# Patient Record
Sex: FEMALE | Race: WHITE | NOT HISPANIC OR LATINO | Employment: OTHER | URBAN - METROPOLITAN AREA
[De-identification: names, ages, dates, MRNs, and addresses within clinical notes are randomized per-mention and may not be internally consistent; named-entity substitution may affect disease eponyms.]

---

## 2020-09-24 ENCOUNTER — OFFICE VISIT (OUTPATIENT)
Dept: ORTHOPEDICS | Facility: CLINIC | Age: 75
End: 2020-09-24
Payer: MEDICARE

## 2020-09-24 VITALS
DIASTOLIC BLOOD PRESSURE: 70 MMHG | BODY MASS INDEX: 20.89 KG/M2 | SYSTOLIC BLOOD PRESSURE: 118 MMHG | HEIGHT: 66 IN | WEIGHT: 130 LBS | HEART RATE: 60 BPM

## 2020-09-24 DIAGNOSIS — M25.562 ACUTE PAIN OF LEFT KNEE: ICD-10-CM

## 2020-09-24 DIAGNOSIS — M23.301 DEGENERATIVE TEAR OF LATERAL MENISCUS, LEFT: Primary | ICD-10-CM

## 2020-09-24 PROCEDURE — 1125F PR PAIN SEVERITY QUANTIFIED, PAIN PRESENT: ICD-10-PCS | Mod: S$GLB,,, | Performed by: ORTHOPAEDIC SURGERY

## 2020-09-24 PROCEDURE — 1101F PT FALLS ASSESS-DOCD LE1/YR: CPT | Mod: S$GLB,,, | Performed by: ORTHOPAEDIC SURGERY

## 2020-09-24 PROCEDURE — 3008F PR BODY MASS INDEX (BMI) DOCUMENTED: ICD-10-PCS | Mod: S$GLB,,, | Performed by: ORTHOPAEDIC SURGERY

## 2020-09-24 PROCEDURE — 1101F PR PT FALLS ASSESS DOC 0-1 FALLS W/OUT INJ PAST YR: ICD-10-PCS | Mod: S$GLB,,, | Performed by: ORTHOPAEDIC SURGERY

## 2020-09-24 PROCEDURE — 3008F BODY MASS INDEX DOCD: CPT | Mod: S$GLB,,, | Performed by: ORTHOPAEDIC SURGERY

## 2020-09-24 PROCEDURE — 1159F MED LIST DOCD IN RCRD: CPT | Mod: S$GLB,,, | Performed by: ORTHOPAEDIC SURGERY

## 2020-09-24 PROCEDURE — 20610 LARGE JOINT ASPIRATION/INJECTION: L KNEE: ICD-10-PCS | Mod: LT,S$GLB,, | Performed by: ORTHOPAEDIC SURGERY

## 2020-09-24 PROCEDURE — 1159F PR MEDICATION LIST DOCUMENTED IN MEDICAL RECORD: ICD-10-PCS | Mod: S$GLB,,, | Performed by: ORTHOPAEDIC SURGERY

## 2020-09-24 PROCEDURE — 1125F AMNT PAIN NOTED PAIN PRSNT: CPT | Mod: S$GLB,,, | Performed by: ORTHOPAEDIC SURGERY

## 2020-09-24 PROCEDURE — 99203 PR OFFICE/OUTPT VISIT, NEW, LEVL III, 30-44 MIN: ICD-10-PCS | Mod: 25,S$GLB,, | Performed by: ORTHOPAEDIC SURGERY

## 2020-09-24 PROCEDURE — 99203 OFFICE O/P NEW LOW 30 MIN: CPT | Mod: 25,S$GLB,, | Performed by: ORTHOPAEDIC SURGERY

## 2020-09-24 PROCEDURE — 20610 DRAIN/INJ JOINT/BURSA W/O US: CPT | Mod: LT,S$GLB,, | Performed by: ORTHOPAEDIC SURGERY

## 2020-09-24 RX ORDER — ESTRADIOL 0.05 MG/D
PATCH TRANSDERMAL
COMMUNITY

## 2020-09-24 RX ORDER — METOPROLOL TARTRATE 50 MG/1
75 TABLET ORAL 2 TIMES DAILY
COMMUNITY
End: 2022-02-24

## 2020-09-24 RX ORDER — LEVOTHYROXINE SODIUM 75 UG/1
TABLET ORAL
COMMUNITY

## 2020-09-24 RX ORDER — HYDROCORTISONE 25 MG/G
OINTMENT TOPICAL
COMMUNITY
End: 2022-02-21

## 2020-09-24 RX ORDER — RIVAROXABAN 20 MG/1
TABLET, FILM COATED ORAL
COMMUNITY

## 2020-09-24 RX ORDER — METOPROLOL TARTRATE 25 MG/1
TABLET, FILM COATED ORAL
COMMUNITY
End: 2022-02-24

## 2020-09-24 RX ORDER — NICOTINE POLACRILEX 2 MG
1 GUM BUCCAL
COMMUNITY
End: 2022-02-21

## 2020-09-24 RX ORDER — METHYLPREDNISOLONE ACETATE 40 MG/ML
40 INJECTION, SUSPENSION INTRA-ARTICULAR; INTRALESIONAL; INTRAMUSCULAR; SOFT TISSUE
Status: DISCONTINUED | OUTPATIENT
Start: 2020-09-24 | End: 2020-09-24 | Stop reason: HOSPADM

## 2020-09-24 RX ADMIN — METHYLPREDNISOLONE ACETATE 40 MG: 40 INJECTION, SUSPENSION INTRA-ARTICULAR; INTRALESIONAL; INTRAMUSCULAR; SOFT TISSUE at 02:09

## 2020-09-24 NOTE — PROCEDURES
Large Joint Aspiration/Injection: L knee    Date/Time: 9/24/2020 2:00 PM  Performed by: Kwadwo Medina MD  Authorized by: Kwadwo Medina MD     Consent Done?:  Yes (Verbal)  Indications:  Pain  Site marked: the procedure site was marked    Timeout: prior to procedure the correct patient, procedure, and site was verified    Prep: patient was prepped and draped in usual sterile fashion      Local anesthesia used?: Yes    Local anesthetic:  Lidocaine 1% without epinephrine    Details:  Needle Size:  25 G  Ultrasonic Guidance for needle placement?: No    Location:  Knee  Site:  L knee  Medications:  40 mg methylPREDNISolone acetate 40 mg/mL  Patient tolerance:  Patient tolerated the procedure well with no immediate complications
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
## 2020-09-29 ENCOUNTER — HOSPITAL ENCOUNTER (OUTPATIENT)
Dept: RADIOLOGY | Facility: OTHER | Age: 75
Discharge: HOME | End: 2020-09-29
Attending: ORTHOPAEDIC SURGERY
Payer: MEDICARE

## 2020-09-29 PROCEDURE — 73721 MRI JNT OF LWR EXTRE W/O DYE: CPT | Mod: TC,LT

## 2020-09-29 PROCEDURE — 73721 MRI KNEE WITHOUT CONTRAST LEFT: ICD-10-PCS | Mod: 26,LT,, | Performed by: RADIOLOGY

## 2020-09-29 PROCEDURE — 73721 MRI JNT OF LWR EXTRE W/O DYE: CPT | Mod: 26,LT,, | Performed by: RADIOLOGY

## 2020-10-06 ENCOUNTER — TELEPHONE (OUTPATIENT)
Dept: ORTHOPEDICS | Facility: CLINIC | Age: 75
End: 2020-10-06

## 2020-10-07 ENCOUNTER — TELEPHONE (OUTPATIENT)
Dept: ORTHOPEDICS | Facility: CLINIC | Age: 75
End: 2020-10-07

## 2020-10-07 NOTE — TELEPHONE ENCOUNTER
----- Message from Megan Parsons sent at 10/7/2020  9:56 AM CDT -----  [phone]-She needs MRI results, lives in [place], CAN'T make appointment, please call

## 2021-05-04 ENCOUNTER — TELEPHONE (OUTPATIENT)
Dept: INTERNAL MEDICINE | Facility: CLINIC | Age: 76
End: 2021-05-04

## 2021-05-05 ENCOUNTER — OFFICE VISIT (OUTPATIENT)
Dept: INTERNAL MEDICINE | Facility: CLINIC | Age: 76
End: 2021-05-05
Payer: MEDICARE

## 2021-05-05 ENCOUNTER — LAB VISIT (OUTPATIENT)
Dept: LAB | Facility: HOSPITAL | Age: 76
End: 2021-05-05
Attending: INTERNAL MEDICINE
Payer: MEDICARE

## 2021-05-05 VITALS
BODY MASS INDEX: 22.85 KG/M2 | HEART RATE: 64 BPM | DIASTOLIC BLOOD PRESSURE: 76 MMHG | HEIGHT: 66 IN | SYSTOLIC BLOOD PRESSURE: 134 MMHG | WEIGHT: 142.19 LBS | OXYGEN SATURATION: 100 % | TEMPERATURE: 97 F

## 2021-05-05 DIAGNOSIS — R06.00 DYSPNEA, UNSPECIFIED TYPE: ICD-10-CM

## 2021-05-05 DIAGNOSIS — I27.21 PULMONARY ARTERIAL HYPERTENSION: ICD-10-CM

## 2021-05-05 DIAGNOSIS — R06.00 DYSPNEA, UNSPECIFIED TYPE: Primary | ICD-10-CM

## 2021-05-05 DIAGNOSIS — I48.0 PAROXYSMAL ATRIAL FIBRILLATION: ICD-10-CM

## 2021-05-05 PROBLEM — D68.59 HYPERCOAGULABLE STATE: Status: ACTIVE | Noted: 2021-05-05

## 2021-05-05 LAB
ALBUMIN SERPL BCP-MCNC: 4 G/DL (ref 3.5–5.2)
ALP SERPL-CCNC: 63 U/L (ref 55–135)
ALT SERPL W/O P-5'-P-CCNC: 29 U/L (ref 10–44)
ANION GAP SERPL CALC-SCNC: 4 MMOL/L (ref 8–16)
AST SERPL-CCNC: 22 U/L (ref 10–40)
BASOPHILS # BLD AUTO: 0.04 K/UL (ref 0–0.2)
BASOPHILS NFR BLD: 0.5 % (ref 0–1.9)
BILIRUB SERPL-MCNC: 0.6 MG/DL (ref 0.1–1)
BNP SERPL-MCNC: 353 PG/ML (ref 0–99)
BUN SERPL-MCNC: 16 MG/DL (ref 8–23)
CALCIUM SERPL-MCNC: 9.8 MG/DL (ref 8.7–10.5)
CHLORIDE SERPL-SCNC: 104 MMOL/L (ref 95–110)
CO2 SERPL-SCNC: 28 MMOL/L (ref 23–29)
CREAT SERPL-MCNC: 0.9 MG/DL (ref 0.5–1.4)
DIFFERENTIAL METHOD: ABNORMAL
EOSINOPHIL # BLD AUTO: 0.4 K/UL (ref 0–0.5)
EOSINOPHIL NFR BLD: 4.6 % (ref 0–8)
ERYTHROCYTE [DISTWIDTH] IN BLOOD BY AUTOMATED COUNT: 13.1 % (ref 11.5–14.5)
EST. GFR  (AFRICAN AMERICAN): >60 ML/MIN/1.73 M^2
EST. GFR  (NON AFRICAN AMERICAN): >60 ML/MIN/1.73 M^2
GLUCOSE SERPL-MCNC: 93 MG/DL (ref 70–110)
HCT VFR BLD AUTO: 38.6 % (ref 37–48.5)
HGB BLD-MCNC: 12.9 G/DL (ref 12–16)
IMM GRANULOCYTES # BLD AUTO: 0.03 K/UL (ref 0–0.04)
IMM GRANULOCYTES NFR BLD AUTO: 0.4 % (ref 0–0.5)
LYMPHOCYTES # BLD AUTO: 1.7 K/UL (ref 1–4.8)
LYMPHOCYTES NFR BLD: 21 % (ref 18–48)
MCH RBC QN AUTO: 33.7 PG (ref 27–31)
MCHC RBC AUTO-ENTMCNC: 33.4 G/DL (ref 32–36)
MCV RBC AUTO: 101 FL (ref 82–98)
MONOCYTES # BLD AUTO: 0.7 K/UL (ref 0.3–1)
MONOCYTES NFR BLD: 8.5 % (ref 4–15)
NEUTROPHILS # BLD AUTO: 5.4 K/UL (ref 1.8–7.7)
NEUTROPHILS NFR BLD: 65 % (ref 38–73)
NRBC BLD-RTO: 0 /100 WBC
PLATELET # BLD AUTO: 172 K/UL (ref 150–450)
PMV BLD AUTO: 11.7 FL (ref 9.2–12.9)
POTASSIUM SERPL-SCNC: 4.7 MMOL/L (ref 3.5–5.1)
PROT SERPL-MCNC: 7.2 G/DL (ref 6–8.4)
RBC # BLD AUTO: 3.83 M/UL (ref 4–5.4)
SODIUM SERPL-SCNC: 136 MMOL/L (ref 136–145)
TSH SERPL DL<=0.005 MIU/L-ACNC: 2.59 UIU/ML (ref 0.4–4)
WBC # BLD AUTO: 8.28 K/UL (ref 3.9–12.7)

## 2021-05-05 PROCEDURE — 1101F PT FALLS ASSESS-DOCD LE1/YR: CPT | Mod: CPTII,S$GLB,, | Performed by: INTERNAL MEDICINE

## 2021-05-05 PROCEDURE — 3288F FALL RISK ASSESSMENT DOCD: CPT | Mod: CPTII,S$GLB,, | Performed by: INTERNAL MEDICINE

## 2021-05-05 PROCEDURE — 84443 ASSAY THYROID STIM HORMONE: CPT | Performed by: INTERNAL MEDICINE

## 2021-05-05 PROCEDURE — 99204 PR OFFICE/OUTPT VISIT, NEW, LEVL IV, 45-59 MIN: ICD-10-PCS | Mod: S$GLB,,, | Performed by: INTERNAL MEDICINE

## 2021-05-05 PROCEDURE — 99999 PR PBB SHADOW E&M-EST. PATIENT-LVL IV: CPT | Mod: PBBFAC,,, | Performed by: INTERNAL MEDICINE

## 2021-05-05 PROCEDURE — 36415 COLL VENOUS BLD VENIPUNCTURE: CPT | Mod: PO | Performed by: INTERNAL MEDICINE

## 2021-05-05 PROCEDURE — 1159F PR MEDICATION LIST DOCUMENTED IN MEDICAL RECORD: ICD-10-PCS | Mod: S$GLB,,, | Performed by: INTERNAL MEDICINE

## 2021-05-05 PROCEDURE — 3288F PR FALLS RISK ASSESSMENT DOCUMENTED: ICD-10-PCS | Mod: CPTII,S$GLB,, | Performed by: INTERNAL MEDICINE

## 2021-05-05 PROCEDURE — 99204 OFFICE O/P NEW MOD 45 MIN: CPT | Mod: S$GLB,,, | Performed by: INTERNAL MEDICINE

## 2021-05-05 PROCEDURE — 1101F PR PT FALLS ASSESS DOC 0-1 FALLS W/OUT INJ PAST YR: ICD-10-PCS | Mod: CPTII,S$GLB,, | Performed by: INTERNAL MEDICINE

## 2021-05-05 PROCEDURE — 1126F PR PAIN SEVERITY QUANTIFIED, NO PAIN PRESENT: ICD-10-PCS | Mod: S$GLB,,, | Performed by: INTERNAL MEDICINE

## 2021-05-05 PROCEDURE — 85025 COMPLETE CBC W/AUTO DIFF WBC: CPT | Performed by: INTERNAL MEDICINE

## 2021-05-05 PROCEDURE — 80053 COMPREHEN METABOLIC PANEL: CPT | Performed by: INTERNAL MEDICINE

## 2021-05-05 PROCEDURE — 99999 PR PBB SHADOW E&M-EST. PATIENT-LVL IV: ICD-10-PCS | Mod: PBBFAC,,, | Performed by: INTERNAL MEDICINE

## 2021-05-05 PROCEDURE — 1159F MED LIST DOCD IN RCRD: CPT | Mod: S$GLB,,, | Performed by: INTERNAL MEDICINE

## 2021-05-05 PROCEDURE — 1126F AMNT PAIN NOTED NONE PRSNT: CPT | Mod: S$GLB,,, | Performed by: INTERNAL MEDICINE

## 2021-05-05 PROCEDURE — 83880 ASSAY OF NATRIURETIC PEPTIDE: CPT | Performed by: INTERNAL MEDICINE

## 2021-05-05 RX ORDER — FUROSEMIDE 20 MG/1
20 TABLET ORAL DAILY PRN
COMMUNITY
End: 2021-05-19 | Stop reason: SDUPTHER

## 2021-05-06 ENCOUNTER — HOSPITAL ENCOUNTER (OUTPATIENT)
Dept: RADIOLOGY | Facility: OTHER | Age: 76
Discharge: HOME | End: 2021-05-06
Attending: INTERNAL MEDICINE
Payer: MEDICARE

## 2021-05-06 ENCOUNTER — TELEPHONE (OUTPATIENT)
Dept: INTERNAL MEDICINE | Facility: CLINIC | Age: 76
End: 2021-05-06

## 2021-05-06 DIAGNOSIS — R06.00 DYSPNEA, UNSPECIFIED TYPE: ICD-10-CM

## 2021-05-06 PROCEDURE — 71046 XR CHEST PA AND LATERAL: ICD-10-PCS | Mod: 26,,, | Performed by: RADIOLOGY

## 2021-05-06 PROCEDURE — 71046 X-RAY EXAM CHEST 2 VIEWS: CPT | Mod: TC,FY

## 2021-05-06 PROCEDURE — 71046 X-RAY EXAM CHEST 2 VIEWS: CPT | Mod: 26,,, | Performed by: RADIOLOGY

## 2021-05-07 DIAGNOSIS — R93.89 ABNORMAL CHEST X-RAY: Primary | ICD-10-CM

## 2021-05-10 ENCOUNTER — TELEPHONE (OUTPATIENT)
Dept: INTERNAL MEDICINE | Facility: CLINIC | Age: 76
End: 2021-05-10

## 2021-05-11 ENCOUNTER — HOSPITAL ENCOUNTER (OUTPATIENT)
Dept: CARDIOLOGY | Facility: OTHER | Age: 76
Discharge: HOME | End: 2021-05-11
Attending: INTERNAL MEDICINE
Payer: MEDICARE

## 2021-05-11 VITALS
HEART RATE: 64 BPM | DIASTOLIC BLOOD PRESSURE: 76 MMHG | SYSTOLIC BLOOD PRESSURE: 134 MMHG | HEIGHT: 66 IN | WEIGHT: 142 LBS | BODY MASS INDEX: 22.82 KG/M2

## 2021-05-11 DIAGNOSIS — I48.0 PAROXYSMAL ATRIAL FIBRILLATION: ICD-10-CM

## 2021-05-11 DIAGNOSIS — I27.21 PULMONARY ARTERIAL HYPERTENSION: ICD-10-CM

## 2021-05-11 DIAGNOSIS — R06.00 DYSPNEA, UNSPECIFIED TYPE: ICD-10-CM

## 2021-05-11 LAB
AV INDEX (PROSTH): 0.72
AV MEAN GRADIENT: 3 MMHG
AV PEAK GRADIENT: 4 MMHG
AV VALVE AREA: 2.13 CM2
AV VELOCITY RATIO: 0.76
BSA FOR ECHO PROCEDURE: 1.73 M2
CV ECHO LV RWT: 0.35 CM
DOP CALC AO PEAK VEL: 1.05 M/S
DOP CALC AO VTI: 26.76 CM
DOP CALC LVOT AREA: 3 CM2
DOP CALC LVOT DIAMETER: 1.94 CM
DOP CALC LVOT PEAK VEL: 0.8 M/S
DOP CALC LVOT STROKE VOLUME: 56.99 CM3
DOP CALCLVOT PEAK VEL VTI: 19.29 CM
E WAVE DECELERATION TIME: 251.11 MSEC
E/A RATIO: 1.32
E/E' RATIO: 6.75 M/S
ECHO LV POSTERIOR WALL: 0.81 CM (ref 0.6–1.1)
EJECTION FRACTION: 60 %
FRACTIONAL SHORTENING: 33 % (ref 28–44)
INTERVENTRICULAR SEPTUM: 0.81 CM (ref 0.6–1.1)
IVRT: 59.98 MSEC
LA MAJOR: 6.13 CM
LA MINOR: 5.75 CM
LA WIDTH: 4.58 CM
LEFT ATRIUM SIZE: 3.79 CM
LEFT ATRIUM VOLUME INDEX MOD: 46.8 ML/M2
LEFT ATRIUM VOLUME INDEX: 50.6 ML/M2
LEFT ATRIUM VOLUME MOD: 81 CM3
LEFT ATRIUM VOLUME: 87.55 CM3
LEFT INTERNAL DIMENSION IN SYSTOLE: 3.06 CM (ref 2.1–4)
LEFT VENTRICLE DIASTOLIC VOLUME INDEX: 56.16 ML/M2
LEFT VENTRICLE DIASTOLIC VOLUME: 97.15 ML
LEFT VENTRICLE MASS INDEX: 69 G/M2
LEFT VENTRICLE SYSTOLIC VOLUME INDEX: 21.2 ML/M2
LEFT VENTRICLE SYSTOLIC VOLUME: 36.72 ML
LEFT VENTRICULAR INTERNAL DIMENSION IN DIASTOLE: 4.6 CM (ref 3.5–6)
LEFT VENTRICULAR MASS: 119.83 G
LV LATERAL E/E' RATIO: 5.4 M/S
LV SEPTAL E/E' RATIO: 9 M/S
MV MEAN GRADIENT: 0 MMHG
MV PEAK A VEL: 0.41 M/S
MV PEAK E VEL: 0.54 M/S
MV PEAK GRADIENT: 1 MMHG
MV STENOSIS PRESSURE HALF TIME: 72.82 MS
MV VALVE AREA P 1/2 METHOD: 3.02 CM2
PISA MRMAX VEL: 0.04 M/S
PISA TR MAX VEL: 2.63 M/S
PULM VEIN S/D RATIO: 0.86
PV PEAK D VEL: 0.44 M/S
PV PEAK S VEL: 0.38 M/S
PV PEAK VELOCITY: 0.85 CM/S
RA MAJOR: 4.41 CM
RA PRESSURE: 3 MMHG
TDI LATERAL: 0.1 M/S
TDI SEPTAL: 0.06 M/S
TDI: 0.08 M/S
TR MAX PG: 28 MMHG
TV REST PULMONARY ARTERY PRESSURE: 31 MMHG

## 2021-05-11 PROCEDURE — 93306 TTE W/DOPPLER COMPLETE: CPT

## 2021-05-11 PROCEDURE — 93306 ECHO (CUPID ONLY): ICD-10-PCS | Mod: 26,,, | Performed by: INTERNAL MEDICINE

## 2021-05-11 PROCEDURE — 93306 TTE W/DOPPLER COMPLETE: CPT | Mod: 26,,, | Performed by: INTERNAL MEDICINE

## 2021-05-12 ENCOUNTER — TELEPHONE (OUTPATIENT)
Dept: INTERNAL MEDICINE | Facility: CLINIC | Age: 76
End: 2021-05-12

## 2021-05-19 RX ORDER — FUROSEMIDE 20 MG/1
20 TABLET ORAL DAILY PRN
Qty: 30 TABLET | Refills: 0 | Status: SHIPPED | OUTPATIENT
Start: 2021-05-19

## 2021-11-01 ENCOUNTER — OFFICE VISIT (OUTPATIENT)
Dept: INTERNAL MEDICINE | Facility: CLINIC | Age: 76
End: 2021-11-01
Payer: MEDICARE

## 2021-11-01 VITALS
HEART RATE: 70 BPM | DIASTOLIC BLOOD PRESSURE: 82 MMHG | OXYGEN SATURATION: 97 % | SYSTOLIC BLOOD PRESSURE: 118 MMHG | RESPIRATION RATE: 17 BRPM | TEMPERATURE: 97 F | WEIGHT: 139.13 LBS | HEIGHT: 65 IN | BODY MASS INDEX: 23.18 KG/M2

## 2021-11-01 DIAGNOSIS — S46.811A TRAPEZIUS STRAIN, RIGHT, INITIAL ENCOUNTER: Primary | ICD-10-CM

## 2021-11-01 PROCEDURE — 99999 PR PBB SHADOW E&M-EST. PATIENT-LVL III: ICD-10-PCS | Mod: PBBFAC,,, | Performed by: NURSE PRACTITIONER

## 2021-11-01 PROCEDURE — 3074F SYST BP LT 130 MM HG: CPT | Mod: CPTII,S$GLB,, | Performed by: NURSE PRACTITIONER

## 2021-11-01 PROCEDURE — 3288F FALL RISK ASSESSMENT DOCD: CPT | Mod: CPTII,S$GLB,, | Performed by: NURSE PRACTITIONER

## 2021-11-01 PROCEDURE — 1159F PR MEDICATION LIST DOCUMENTED IN MEDICAL RECORD: ICD-10-PCS | Mod: CPTII,S$GLB,, | Performed by: NURSE PRACTITIONER

## 2021-11-01 PROCEDURE — 1159F MED LIST DOCD IN RCRD: CPT | Mod: CPTII,S$GLB,, | Performed by: NURSE PRACTITIONER

## 2021-11-01 PROCEDURE — 1101F PT FALLS ASSESS-DOCD LE1/YR: CPT | Mod: CPTII,S$GLB,, | Performed by: NURSE PRACTITIONER

## 2021-11-01 PROCEDURE — 99214 OFFICE O/P EST MOD 30 MIN: CPT | Mod: S$GLB,,, | Performed by: NURSE PRACTITIONER

## 2021-11-01 PROCEDURE — 1101F PR PT FALLS ASSESS DOC 0-1 FALLS W/OUT INJ PAST YR: ICD-10-PCS | Mod: CPTII,S$GLB,, | Performed by: NURSE PRACTITIONER

## 2021-11-01 PROCEDURE — 3079F DIAST BP 80-89 MM HG: CPT | Mod: CPTII,S$GLB,, | Performed by: NURSE PRACTITIONER

## 2021-11-01 PROCEDURE — 3288F PR FALLS RISK ASSESSMENT DOCUMENTED: ICD-10-PCS | Mod: CPTII,S$GLB,, | Performed by: NURSE PRACTITIONER

## 2021-11-01 PROCEDURE — 99999 PR PBB SHADOW E&M-EST. PATIENT-LVL III: CPT | Mod: PBBFAC,,, | Performed by: NURSE PRACTITIONER

## 2021-11-01 PROCEDURE — 3074F PR MOST RECENT SYSTOLIC BLOOD PRESSURE < 130 MM HG: ICD-10-PCS | Mod: CPTII,S$GLB,, | Performed by: NURSE PRACTITIONER

## 2021-11-01 PROCEDURE — 3079F PR MOST RECENT DIASTOLIC BLOOD PRESSURE 80-89 MM HG: ICD-10-PCS | Mod: CPTII,S$GLB,, | Performed by: NURSE PRACTITIONER

## 2021-11-01 PROCEDURE — 99214 PR OFFICE/OUTPT VISIT, EST, LEVL IV, 30-39 MIN: ICD-10-PCS | Mod: S$GLB,,, | Performed by: NURSE PRACTITIONER

## 2021-11-01 PROCEDURE — 1125F AMNT PAIN NOTED PAIN PRSNT: CPT | Mod: CPTII,S$GLB,, | Performed by: NURSE PRACTITIONER

## 2021-11-01 PROCEDURE — 1125F PR PAIN SEVERITY QUANTIFIED, PAIN PRESENT: ICD-10-PCS | Mod: CPTII,S$GLB,, | Performed by: NURSE PRACTITIONER

## 2021-11-01 RX ORDER — METHYLPREDNISOLONE 4 MG/1
TABLET ORAL
Qty: 1 EACH | Refills: 0 | Status: SHIPPED | OUTPATIENT
Start: 2021-11-01 | End: 2021-11-22

## 2021-11-01 RX ORDER — ESTRADIOL 0.03 MG/D
PATCH TRANSDERMAL
COMMUNITY
End: 2022-02-21

## 2021-11-01 RX ORDER — METHOCARBAMOL 500 MG/1
500 TABLET, FILM COATED ORAL 4 TIMES DAILY
Qty: 40 TABLET | Refills: 0 | Status: SHIPPED | OUTPATIENT
Start: 2021-11-01 | End: 2021-11-11

## 2021-11-01 RX ORDER — LEVOTHYROXINE SODIUM 100 UG/1
TABLET ORAL
COMMUNITY
End: 2022-02-21

## 2022-02-12 ENCOUNTER — TELEPHONE (OUTPATIENT)
Dept: URGENT CARE | Facility: CLINIC | Age: 77
End: 2022-02-12

## 2022-02-12 ENCOUNTER — OFFICE VISIT (OUTPATIENT)
Dept: URGENT CARE | Facility: CLINIC | Age: 77
End: 2022-02-12
Payer: MEDICARE

## 2022-02-12 VITALS
TEMPERATURE: 99 F | OXYGEN SATURATION: 98 % | DIASTOLIC BLOOD PRESSURE: 79 MMHG | WEIGHT: 139 LBS | BODY MASS INDEX: 23.16 KG/M2 | SYSTOLIC BLOOD PRESSURE: 130 MMHG | RESPIRATION RATE: 17 BRPM | HEIGHT: 65 IN | HEART RATE: 85 BPM

## 2022-02-12 DIAGNOSIS — B37.31 YEAST VAGINITIS: Primary | ICD-10-CM

## 2022-02-12 DIAGNOSIS — M1A.9XX0 CHRONIC GOUT INVOLVING TOE OF LEFT FOOT WITHOUT TOPHUS, UNSPECIFIED CAUSE: ICD-10-CM

## 2022-02-12 DIAGNOSIS — L03.116 CELLULITIS OF LEFT LOWER EXTREMITY: ICD-10-CM

## 2022-02-12 DIAGNOSIS — R30.0 DYSURIA: ICD-10-CM

## 2022-02-12 PROBLEM — Z85.820 HISTORY OF MALIGNANT MELANOMA: Status: ACTIVE | Noted: 2018-08-28

## 2022-02-12 LAB
BILIRUB UR QL STRIP: NEGATIVE
GLUCOSE UR QL STRIP: NEGATIVE
KETONES UR QL STRIP: NEGATIVE
LEUKOCYTE ESTERASE UR QL STRIP: NEGATIVE
PH, POC UA: 5.5 (ref 5–8)
POC BLOOD, URINE: POSITIVE
POC NITRATES, URINE: NEGATIVE
PROT UR QL STRIP: NEGATIVE
SP GR UR STRIP: 1.02 (ref 1–1.03)
URATE SERPL-MCNC: 5.8 MG/DL (ref 2.4–5.7)
UROBILINOGEN UR STRIP-ACNC: NORMAL (ref 0.1–1.1)

## 2022-02-12 PROCEDURE — 99214 OFFICE O/P EST MOD 30 MIN: CPT | Mod: S$GLB,,, | Performed by: FAMILY MEDICINE

## 2022-02-12 PROCEDURE — 3078F DIAST BP <80 MM HG: CPT | Mod: CPTII,S$GLB,, | Performed by: FAMILY MEDICINE

## 2022-02-12 PROCEDURE — 81003 URINALYSIS AUTO W/O SCOPE: CPT | Mod: QW,S$GLB,, | Performed by: FAMILY MEDICINE

## 2022-02-12 PROCEDURE — 99214 PR OFFICE/OUTPT VISIT, EST, LEVL IV, 30-39 MIN: ICD-10-PCS | Mod: S$GLB,,, | Performed by: FAMILY MEDICINE

## 2022-02-12 PROCEDURE — 1160F PR REVIEW ALL MEDS BY PRESCRIBER/CLIN PHARMACIST DOCUMENTED: ICD-10-PCS | Mod: CPTII,S$GLB,, | Performed by: FAMILY MEDICINE

## 2022-02-12 PROCEDURE — 1160F RVW MEDS BY RX/DR IN RCRD: CPT | Mod: CPTII,S$GLB,, | Performed by: FAMILY MEDICINE

## 2022-02-12 PROCEDURE — 3075F PR MOST RECENT SYSTOLIC BLOOD PRESS GE 130-139MM HG: ICD-10-PCS | Mod: CPTII,S$GLB,, | Performed by: FAMILY MEDICINE

## 2022-02-12 PROCEDURE — 84550 ASSAY OF BLOOD/URIC ACID: CPT | Performed by: FAMILY MEDICINE

## 2022-02-12 PROCEDURE — 81003 POCT URINALYSIS, DIPSTICK, AUTOMATED, W/O SCOPE: ICD-10-PCS | Mod: QW,S$GLB,, | Performed by: FAMILY MEDICINE

## 2022-02-12 PROCEDURE — 1159F MED LIST DOCD IN RCRD: CPT | Mod: CPTII,S$GLB,, | Performed by: FAMILY MEDICINE

## 2022-02-12 PROCEDURE — 1159F PR MEDICATION LIST DOCUMENTED IN MEDICAL RECORD: ICD-10-PCS | Mod: CPTII,S$GLB,, | Performed by: FAMILY MEDICINE

## 2022-02-12 PROCEDURE — 3078F PR MOST RECENT DIASTOLIC BLOOD PRESSURE < 80 MM HG: ICD-10-PCS | Mod: CPTII,S$GLB,, | Performed by: FAMILY MEDICINE

## 2022-02-12 PROCEDURE — 3075F SYST BP GE 130 - 139MM HG: CPT | Mod: CPTII,S$GLB,, | Performed by: FAMILY MEDICINE

## 2022-02-12 RX ORDER — RANOLAZINE 500 MG/1
TABLET, EXTENDED RELEASE ORAL
COMMUNITY
End: 2022-02-24 | Stop reason: ALTCHOICE

## 2022-02-12 RX ORDER — PROPAFENONE HYDROCHLORIDE 150 MG/1
TABLET, COATED ORAL
COMMUNITY
End: 2022-02-24 | Stop reason: ALTCHOICE

## 2022-02-12 RX ORDER — SULFAMETHOXAZOLE AND TRIMETHOPRIM 800; 160 MG/1; MG/1
1 TABLET ORAL 2 TIMES DAILY
Qty: 14 TABLET | Refills: 0 | Status: SHIPPED | OUTPATIENT
Start: 2022-02-12 | End: 2022-02-19

## 2022-02-12 RX ORDER — ALLOPURINOL 300 MG/1
300 TABLET ORAL DAILY
Qty: 30 TABLET | Refills: 1 | Status: SHIPPED | OUTPATIENT
Start: 2022-02-12 | End: 2022-03-14

## 2022-02-12 RX ORDER — MUPIROCIN 20 MG/G
OINTMENT TOPICAL
Qty: 22 G | Refills: 1 | Status: SHIPPED | OUTPATIENT
Start: 2022-02-12 | End: 2022-02-21

## 2022-02-12 RX ORDER — PREDNISONE 20 MG/1
TABLET ORAL
Qty: 16 TABLET | Refills: 0 | Status: SHIPPED | OUTPATIENT
Start: 2022-02-12 | End: 2022-02-21

## 2022-02-12 RX ORDER — FLUCONAZOLE 150 MG/1
150 TABLET ORAL DAILY
Qty: 1 TABLET | Refills: 1 | Status: SHIPPED | OUTPATIENT
Start: 2022-02-12 | End: 2022-02-12 | Stop reason: ALTCHOICE

## 2022-02-12 RX ORDER — FLUCONAZOLE 150 MG/1
150 TABLET ORAL DAILY
Qty: 1 TABLET | Refills: 1 | Status: SHIPPED | OUTPATIENT
Start: 2022-02-12 | End: 2022-02-12

## 2022-02-12 NOTE — TELEPHONE ENCOUNTER
I phoned pt with her uric acid level results which were just slightly elevated. Her symptoms still appeared to be primarily from gout so I recommend she continue the prednisone. She is also to follow with her podiatrist as planned on Wednesday. And then follow with her PCP about the gout. In the mean time she can continue the lower dose of allopurinol or take 2 of the 100mg tablets until she sees her PCP and or podiatrist for further guidance. Also continue bactroban and add oral med only if things worsen.

## 2022-02-12 NOTE — PATIENT INSTRUCTIONS
Patient Education  Follow with your PCP     Cellulitis (Skin Infection), Adult ED   General Information   You came to the Emergency Department (ED) for a skin infection. All people have germs on their skin. Most of the time, these germs do not cause a problem. A skin infection means the germs have gotten into the layers of your skin. You need antibiotics to treat the infection. It is important to take all of your antibiotics even if you start to feel better. If not, your infection may come back and be more serious than before.  What care is needed at home?   · Call your regular doctor to let them know you were in the ED. Make a follow-up appointment if you were told to.  · Prop your painful body part on pillows, keeping it above the level of your heart. This may help lessen pain and swelling.  · Keep your infected area clean and dry. You can gently wash the area with soap and water or take a shower. Pat the area dry with a clean towel.  · Wash your hands before and after you touch your infected area. However, someone cannot catch cellulitis from someone else.  · Do not put an antibiotic ointment on the infected area.  When do I need to call the doctor?   · You have a fever of 100.4°F (38°C) or higher or chills.  · The area becomes more red, swollen, or painful.  · The redness or swelling spreads up your leg or arm or to a larger area.  · The infected area is not better after 3 days of taking antibiotics.  · You have new or worsening symptoms.  Last Reviewed Date   2021-03-29  Consumer Information Use and Disclaimer   This information is not specific medical advice and does not replace information you receive from your health care provider. This is only a brief summary of general information. It does NOT include all information about conditions, illnesses, injuries, tests, procedures, treatments, therapies, discharge instructions or life-style choices that may apply to you. You must talk with your health care  provider for complete information about your health and treatment options. This information should not be used to decide whether or not to accept your health care providers advice, instructions or recommendations. Only your health care provider has the knowledge and training to provide advice that is right for you.  Copyright   Copyright © 2021 [employer] and its affiliates and/or licensors. All rights reserved.  Patient Education       Gout ED   General Information   You came to the Emergency Department (ED) because of pain and swelling in a joint. The doctors feel this problem is due to gout which is a form of arthritis. Gout can happen in some people who have too much uric acid in their blood. Everyone has some uric acid in their blood. It is produced when the body breaks down certain foods. In some cases, too much uric acid builds up.  Uric acid can form sharp crystals that can sometimes build up in different parts of your body, like your joints. Then you may have pain and swelling. This is called a gout flare or attack. Most of the time a painful joint from gout will get better, especially with treatment, within a few days to a few weeks.  What care is needed at home?   · Call your regular doctor to let them know you were in the ED. Make a follow-up appointment if you were told to.  · Take all your medicines as ordered for treating your gout flare. This may include medicines ordered by your doctor or medicines like ibuprofen or naproxen for swelling and pain. These are nonsteroidal anti-inflammatory drugs (NSAIDS). Some over-the-counter medicines and prescription medicines contain the same drug. Do not take multiple medicines without talking to your doctor first.  · Do not take aspirin to treat your gout flare.  · Rest your joint. Prop it on pillows, keeping it above the level of your heart. This may help lessen pain and swelling.  · Ice may help with your pain. Place an ice pack or a bag of frozen  vegetables wrapped in a towel over the painful part. Never put ice right on the skin. Do not leave the ice on more than 10 to 15 minutes at a time.  · Eat a healthy diet that includes plenty of fruits, vegetables, whole grain, and low-fat dairy products.  · Drink plenty of water. Limit sugary drinks and alcohol as they can make your gout flare worse.  · Talk with your regular doctor about other medicines or lifestyle changes that can help prevent gout flares. Keeping a healthy weight or losing weight in a healthy way may help.  When do I need to get emergency help?   · Return to the ED if:   ? You have a fever of 102.2°F (39°C) or higher, or chills with severe joint pain that does not get better with treatment.  When do I need to call the doctor?   · You have a fever of 100.4°F (38°C) or higher or chills.  · You have pain with passing urine.  · Your symptoms are not improving within 2 to 3 days or your pain does not go away completely after a few weeks.  · If you have new or worsening problems.  Last Reviewed Date   2021-04-09  Consumer Information Use and Disclaimer   This information is not specific medical advice and does not replace information you receive from your health care provider. This is only a brief summary of general information. It does NOT include all information about conditions, illnesses, injuries, tests, procedures, treatments, therapies, discharge instructions or life-style choices that may apply to you. You must talk with your health care provider for complete information about your health and treatment options. This information should not be used to decide whether or not to accept your health care providers advice, instructions or recommendations. Only your health care provider has the knowledge and training to provide advice that is right for you.  Copyright   Copyright © 2021 [employer], Inc. and its affiliates and/or licensors. All rights reserved.  Patient Education       Vulvovaginal Yeast  "Infection   The Basics   Written by the doctors and editors at [facility]   What is a vulvovaginal yeast infection? -- A vulvovaginal yeast infection is an infection that causes itching and irritation of the vulva, the outer lips of the vagina (figure 1). This type of infection is usually caused by a fungus called "candida." (Yeast are a type of fungus.)  What are the symptoms of a yeast infection? -- Symptoms include:  · Itching of the vulva (this is the most common symptom)  · Pain, redness, or irritation of the vulva and vagina  · Pain when you urinate  · Pain during sex  · Abnormal vaginal discharge, which might be thick and white or thin and watery  How do I know if my symptoms are caused by a yeast infection? -- Most people cannot tell whether they have a yeast infection or something else. The symptoms of a yeast infection are a lot like the symptoms of many other conditions, so it is hard to tell.  The best way find out if you have a yeast infection is to see your doctor or nurse. They can run a swab (Q-tip) inside your vagina to collect vaginal fluids. Then, they can look at the vaginal fluids from the swab under a microscope and look for the fungus that causes yeast infections. Sometimes a test is done to find out which type of yeast you have.  Depending on your situation, your doctor or nurse might do other tests on your vaginal fluid, too. One common test checks for yeast infections as well as bacterial vaginosis and trichomoniasis. These are other infections that can also cause itching and irritation.   How did I get a yeast infection? -- The fungus that causes yeast infections normally lives in the vagina and the gut. Even though the yeast are there, they do not usually cause symptoms. Certain medicines (especially antibiotics), stress, and other factors can cause the fungus to grow more than it should. When that happens, a yeast infection can start.  How are yeast infections treated? -- Yeast infections " can be treated with a pill that you swallow or with medicines that you put in the vagina and on the vulva. The medicines that you put in the vagina come in creams and tablets. All medicines for yeast infections work by killing the fungus that causes the infections.  When will I feel better? -- You will probably feel better within a few days of starting treatment. If you do not get better after you finish treatment, you should see your doctor or nurse again. You might need to take more medicine or a different medicine.  What if I get yeast infections often? -- Be sure to see or doctor or nurse about it. That way you can find out for sure whether your symptoms are caused by a yeast infection and, if so, which type of yeast. There are a few different types of yeast, and they respond to different treatments. Plus, the same symptoms that you get with a yeast infection can sometimes be caused by other types of infections, an allergy, or other problems. If you get frequent infections, you might need a different treatment than you have tried in the past.  All topics are updated as new evidence becomes available and our peer review process is complete.  This topic retrieved from [employer] on: Sep 21, 2021.  Topic 29280 Version 9.0  Release: 29.4.2 - C29.263  © 2021 UpToDate, Inc. and/or its affiliates. All rights reserved.  figure 1: Adult female external genitalia     This drawing shows the different parts of the genitals.  Graphic 94358 Version 9.0    Consumer Information Use and Disclaimer   This information is not specific medical advice and does not replace information you receive from your health care provider. This is only a brief summary of general information. It does NOT include all information about conditions, illnesses, injuries, tests, procedures, treatments, therapies, discharge instructions or life-style choices that may apply to you. You must talk with your health care provider for complete information about  your health and treatment options. This information should not be used to decide whether or not to accept your health care provider's advice, instructions or recommendations. Only your health care provider has the knowledge and training to provide advice that is right for you. The use of this information is governed by the [employer] End User License Agreement, available at https://www.[email or web address]/en/solutions/QPSoftware/about/ava.The use of [employer] content is governed by the [employer] Terms of Use. ©2021 [employer] All rights reserved.  Copyright   © 2021 [employer] and/or its affiliates. All rights reserved.

## 2022-02-12 NOTE — PROGRESS NOTES
"Subjective:       Patient ID: Loida Martin is a 76 y.o. female.    Vitals:  height is 5' 5" (1.651 m) and weight is 63 kg (139 lb). Her temperature is 98.6 °F (37 °C). Her blood pressure is 130/79 and her pulse is 85. Her respiration is 17 and oxygen saturation is 98%.     Chief Complaint: Urinary Tract Infection (Entered by patient)    Pt presents with complaint of dysuria, vaginal itching, and left big toe pain/infection.  Pt sttaes she had an in-grown toe nail removed a week ago and says it has since started to become infected.  Pt states her dysuria started about two days ago and believes the symptoms are due to the antibiotic prescribed(keflex for 5 days). She denies urinary frequency.    or urgency. She also has gout (diagnosed in December) for which she got a shot of steroid then and was also started on low dose allopurinol 100 mg . Her blood level was very high and she hasnt had another blood test since    Urinary Tract Infection   This is a new problem. The current episode started yesterday. The problem occurs every urination. The problem has been unchanged. The quality of the pain is described as burning. The pain is at a severity of 4/10. The pain is mild. There has been no fever. She is not sexually active. There is no history of pyelonephritis. Associated symptoms include frequency. Pertinent negatives include no discharge, flank pain or constipation. She has tried nothing for the symptoms. The treatment provided no relief.       Gastrointestinal: Negative for constipation.   Genitourinary: Positive for dysuria and frequency. Negative for flank pain, vaginal pain, vaginal discharge, vaginal bleeding and genital sore.   Allergic/Immunologic: Positive for itching.       Objective:      Physical Exam   Constitutional: She is oriented to person, place, and time.   Abdominal: Normal appearance.   Neurological: no focal deficit. She is alert and oriented to person, place, and time.   Skin: Skin is warm " and dry.         Comments: Sequela of recent ingrown toenail surgery to left great toe with crusting and scab about left great toenail laterally./ In addition there is erythema and induration about the proximal nail bed.  Also swelling and redness of left great toe at the MCP joint and pain with ROM there consistent with gout.    Nursing note and vitals reviewed.        Assessment:       1. Yeast vaginitis    2. Chronic gout involving toe of left foot without tophus, unspecified cause    3. Dysuria    4. Cellulitis of left lower extremity          Plan:         Yeast vaginitis  -     fluconazole (DIFLUCAN) 150 MG Tab; Take 1 tablet (150 mg total) by mouth once daily. May repeat if symptoms persist after 5 days for 1 day  Dispense: 1 tablet; Refill: 1    Chronic gout involving toe of left foot without tophus, unspecified cause  -     Uric Acid  -     predniSONE (DELTASONE) 20 MG tablet; 3 tabs for 2 days then 2 tabs for 3 days then 1 tab for 3 days then 1/2 tab for 2 days  Dispense: 16 tablet; Refill: 0    Dysuria  -     POCT Urinalysis, Dipstick, Automated, W/O Scope    Cellulitis of left lower extremity  -     sulfamethoxazole-trimethoprim 800-160mg (BACTRIM DS) 800-160 mg Tab; Take 1 tablet by mouth 2 (two) times daily. for 7 days  Dispense: 14 tablet; Refill: 0  -     mupirocin (BACTROBAN) 2 % ointment; Apply to affected area 3 times daily  Dispense: 22 g; Refill: 1      Patient Instructions   Patient Education       Cellulitis (Skin Infection), Adult ED   General Information   You came to the Emergency Department (ED) for a skin infection. All people have germs on their skin. Most of the time, these germs do not cause a problem. A skin infection means the germs have gotten into the layers of your skin. You need antibiotics to treat the infection. It is important to take all of your antibiotics even if you start to feel better. If not, your infection may come back and be more serious than before.  What care is  needed at home?   · Call your regular doctor to let them know you were in the ED. Make a follow-up appointment if you were told to.  · Prop your painful body part on pillows, keeping it above the level of your heart. This may help lessen pain and swelling.  · Keep your infected area clean and dry. You can gently wash the area with soap and water or take a shower. Pat the area dry with a clean towel.  · Wash your hands before and after you touch your infected area. However, someone cannot catch cellulitis from someone else.  · Do not put an antibiotic ointment on the infected area.  When do I need to call the doctor?   · You have a fever of 100.4°F (38°C) or higher or chills.  · The area becomes more red, swollen, or painful.  · The redness or swelling spreads up your leg or arm or to a larger area.  · The infected area is not better after 3 days of taking antibiotics.  · You have new or worsening symptoms.  Last Reviewed Date   2021-03-29  Consumer Information Use and Disclaimer   This information is not specific medical advice and does not replace information you receive from your health care provider. This is only a brief summary of general information. It does NOT include all information about conditions, illnesses, injuries, tests, procedures, treatments, therapies, discharge instructions or life-style choices that may apply to you. You must talk with your health care provider for complete information about your health and treatment options. This information should not be used to decide whether or not to accept your health care providers advice, instructions or recommendations. Only your health care provider has the knowledge and training to provide advice that is right for you.  Copyright   Copyright © 2021 [employer] and its affiliates and/or licensors. All rights reserved.  Patient Education       Gout ED   General Information   You came to the Emergency Department (ED) because of pain and swelling in a  joint. The doctors feel this problem is due to gout which is a form of arthritis. Gout can happen in some people who have too much uric acid in their blood. Everyone has some uric acid in their blood. It is produced when the body breaks down certain foods. In some cases, too much uric acid builds up.  Uric acid can form sharp crystals that can sometimes build up in different parts of your body, like your joints. Then you may have pain and swelling. This is called a gout flare or attack. Most of the time a painful joint from gout will get better, especially with treatment, within a few days to a few weeks.  What care is needed at home?   · Call your regular doctor to let them know you were in the ED. Make a follow-up appointment if you were told to.  · Take all your medicines as ordered for treating your gout flare. This may include medicines ordered by your doctor or medicines like ibuprofen or naproxen for swelling and pain. These are nonsteroidal anti-inflammatory drugs (NSAIDS). Some over-the-counter medicines and prescription medicines contain the same drug. Do not take multiple medicines without talking to your doctor first.  · Do not take aspirin to treat your gout flare.  · Rest your joint. Prop it on pillows, keeping it above the level of your heart. This may help lessen pain and swelling.  · Ice may help with your pain. Place an ice pack or a bag of frozen vegetables wrapped in a towel over the painful part. Never put ice right on the skin. Do not leave the ice on more than 10 to 15 minutes at a time.  · Eat a healthy diet that includes plenty of fruits, vegetables, whole grain, and low-fat dairy products.  · Drink plenty of water. Limit sugary drinks and alcohol as they can make your gout flare worse.  · Talk with your regular doctor about other medicines or lifestyle changes that can help prevent gout flares. Keeping a healthy weight or losing weight in a healthy way may help.  When do I need to get  "emergency help?   · Return to the ED if:   ? You have a fever of 102.2°F (39°C) or higher, or chills with severe joint pain that does not get better with treatment.  When do I need to call the doctor?   · You have a fever of 100.4°F (38°C) or higher or chills.  · You have pain with passing urine.  · Your symptoms are not improving within 2 to 3 days or your pain does not go away completely after a few weeks.  · If you have new or worsening problems.  Last Reviewed Date   2021-04-09  Consumer Information Use and Disclaimer   This information is not specific medical advice and does not replace information you receive from your health care provider. This is only a brief summary of general information. It does NOT include all information about conditions, illnesses, injuries, tests, procedures, treatments, therapies, discharge instructions or life-style choices that may apply to you. You must talk with your health care provider for complete information about your health and treatment options. This information should not be used to decide whether or not to accept your health care providers advice, instructions or recommendations. Only your health care provider has the knowledge and training to provide advice that is right for you.  Copyright   Copyright © 2021 UpToDate, Inc. and its affiliates and/or licensors. All rights reserved.  Patient Education       Vulvovaginal Yeast Infection   The Basics   Written by the doctors and editors at [employer]   What is a vulvovaginal yeast infection? -- A vulvovaginal yeast infection is an infection that causes itching and irritation of the vulva, the outer lips of the vagina (figure 1). This type of infection is usually caused by a fungus called "candida." (Yeast are a type of fungus.)  What are the symptoms of a yeast infection? -- Symptoms include:  · Itching of the vulva (this is the most common symptom)  · Pain, redness, or irritation of the vulva and vagina  · Pain when you " urinate  · Pain during sex  · Abnormal vaginal discharge, which might be thick and white or thin and watery  How do I know if my symptoms are caused by a yeast infection? -- Most people cannot tell whether they have a yeast infection or something else. The symptoms of a yeast infection are a lot like the symptoms of many other conditions, so it is hard to tell.  The best way find out if you have a yeast infection is to see your doctor or nurse. They can run a swab (Q-tip) inside your vagina to collect vaginal fluids. Then, they can look at the vaginal fluids from the swab under a microscope and look for the fungus that causes yeast infections. Sometimes a test is done to find out which type of yeast you have.  Depending on your situation, your doctor or nurse might do other tests on your vaginal fluid, too. One common test checks for yeast infections as well as bacterial vaginosis and trichomoniasis. These are other infections that can also cause itching and irritation.   How did I get a yeast infection? -- The fungus that causes yeast infections normally lives in the vagina and the gut. Even though the yeast are there, they do not usually cause symptoms. Certain medicines (especially antibiotics), stress, and other factors can cause the fungus to grow more than it should. When that happens, a yeast infection can start.  How are yeast infections treated? -- Yeast infections can be treated with a pill that you swallow or with medicines that you put in the vagina and on the vulva. The medicines that you put in the vagina come in creams and tablets. All medicines for yeast infections work by killing the fungus that causes the infections.  When will I feel better? -- You will probably feel better within a few days of starting treatment. If you do not get better after you finish treatment, you should see your doctor or nurse again. You might need to take more medicine or a different medicine.  What if I get yeast  infections often? -- Be sure to see or doctor or nurse about it. That way you can find out for sure whether your symptoms are caused by a yeast infection and, if so, which type of yeast. There are a few different types of yeast, and they respond to different treatments. Plus, the same symptoms that you get with a yeast infection can sometimes be caused by other types of infections, an allergy, or other problems. If you get frequent infections, you might need a different treatment than you have tried in the past.  All topics are updated as new evidence becomes available and our peer review process is complete.  This topic retrieved from [employer] on: Sep 21, 2021.  Topic 20223 Version 9.0  Release: 29.4.2 - C29.263  © 2021 UpToDate, Inc. and/or its affiliates. All rights reserved.  figure 1: Adult female external genitalia     This drawing shows the different parts of the genitals.  Graphic 17521 Version 9.0    Consumer Information Use and Disclaimer   This information is not specific medical advice and does not replace information you receive from your health care provider. This is only a brief summary of general information. It does NOT include all information about conditions, illnesses, injuries, tests, procedures, treatments, therapies, discharge instructions or life-style choices that may apply to you. You must talk with your health care provider for complete information about your health and treatment options. This information should not be used to decide whether or not to accept your health care provider's advice, instructions or recommendations. Only your health care provider has the knowledge and training to provide advice that is right for you. The use of this information is governed by the [employer] End User License Agreement, available at [email or web address]://[email or web address]/ava.The use of [employer] content is governed by the [employer] Terms of Use. ©2021 UpToDate, Inc. All rights  reserved.  Copyright   © 2021 [employer] and/or its affiliates. All rights reserved.

## 2022-02-12 NOTE — TELEPHONE ENCOUNTER
Pt phoned after pharmacists informed her there was  Drug interaction between the diflucan and her propafenone(Rhymol) that she takes daily. In reviewing drug interactions it appears the risk is small at the very low dose (and only 1 day) that is normally used for vaginal yeast infections however out of an abundace of caution I switched her to ibrexafungerp which does not have a contraindication. I wished her well and asked her to call back with any problems

## 2022-02-21 ENCOUNTER — HOSPITAL ENCOUNTER (EMERGENCY)
Facility: HOSPITAL | Age: 77
Discharge: HOME | End: 2022-02-21
Attending: EMERGENCY MEDICINE
Payer: MEDICARE

## 2022-02-21 ENCOUNTER — OFFICE VISIT (OUTPATIENT)
Dept: INTERNAL MEDICINE | Facility: CLINIC | Age: 77
End: 2022-02-21
Payer: MEDICARE

## 2022-02-21 VITALS
WEIGHT: 144.63 LBS | OXYGEN SATURATION: 98 % | HEART RATE: 82 BPM | DIASTOLIC BLOOD PRESSURE: 60 MMHG | HEIGHT: 65 IN | BODY MASS INDEX: 24.1 KG/M2 | SYSTOLIC BLOOD PRESSURE: 100 MMHG

## 2022-02-21 VITALS
HEART RATE: 76 BPM | TEMPERATURE: 98 F | HEIGHT: 65 IN | OXYGEN SATURATION: 96 % | BODY MASS INDEX: 23.32 KG/M2 | SYSTOLIC BLOOD PRESSURE: 127 MMHG | DIASTOLIC BLOOD PRESSURE: 66 MMHG | RESPIRATION RATE: 22 BRPM | WEIGHT: 140 LBS

## 2022-02-21 DIAGNOSIS — R06.02 SOB (SHORTNESS OF BREATH): Primary | ICD-10-CM

## 2022-02-21 DIAGNOSIS — I48.0 PAROXYSMAL ATRIAL FIBRILLATION: ICD-10-CM

## 2022-02-21 DIAGNOSIS — R07.9 CHEST PAIN: ICD-10-CM

## 2022-02-21 DIAGNOSIS — Z85.3 PERSONAL HISTORY OF MALIGNANT NEOPLASM OF BREAST: ICD-10-CM

## 2022-02-21 DIAGNOSIS — R06.09 DOE (DYSPNEA ON EXERTION): Primary | ICD-10-CM

## 2022-02-21 DIAGNOSIS — R00.2 HEART PALPITATIONS: ICD-10-CM

## 2022-02-21 LAB
ALBUMIN SERPL BCP-MCNC: 3.7 G/DL (ref 3.5–5.2)
ALP SERPL-CCNC: 62 U/L (ref 55–135)
ALT SERPL W/O P-5'-P-CCNC: 16 U/L (ref 10–44)
ANION GAP SERPL CALC-SCNC: 10 MMOL/L (ref 8–16)
AST SERPL-CCNC: 20 U/L (ref 10–40)
BASOPHILS # BLD AUTO: 0.02 K/UL (ref 0–0.2)
BASOPHILS NFR BLD: 0.2 % (ref 0–1.9)
BILIRUB SERPL-MCNC: 0.9 MG/DL (ref 0.1–1)
BNP SERPL-MCNC: 158 PG/ML (ref 0–99)
BUN SERPL-MCNC: 20 MG/DL (ref 8–23)
CALCIUM SERPL-MCNC: 8.9 MG/DL (ref 8.7–10.5)
CHLORIDE SERPL-SCNC: 102 MMOL/L (ref 95–110)
CO2 SERPL-SCNC: 23 MMOL/L (ref 23–29)
CREAT SERPL-MCNC: 0.9 MG/DL (ref 0.5–1.4)
DIFFERENTIAL METHOD: ABNORMAL
EOSINOPHIL # BLD AUTO: 0.1 K/UL (ref 0–0.5)
EOSINOPHIL NFR BLD: 1.1 % (ref 0–8)
ERYTHROCYTE [DISTWIDTH] IN BLOOD BY AUTOMATED COUNT: 13 % (ref 11.5–14.5)
EST. GFR  (AFRICAN AMERICAN): >60 ML/MIN/1.73 M^2
EST. GFR  (NON AFRICAN AMERICAN): >60 ML/MIN/1.73 M^2
GLUCOSE SERPL-MCNC: 112 MG/DL (ref 70–110)
HCT VFR BLD AUTO: 34.4 % (ref 37–48.5)
HGB BLD-MCNC: 11.7 G/DL (ref 12–16)
IMM GRANULOCYTES # BLD AUTO: 0.06 K/UL (ref 0–0.04)
IMM GRANULOCYTES NFR BLD AUTO: 0.7 % (ref 0–0.5)
INR PPP: 1.2 (ref 0.8–1.2)
LYMPHOCYTES # BLD AUTO: 0.5 K/UL (ref 1–4.8)
LYMPHOCYTES NFR BLD: 5.3 % (ref 18–48)
MCH RBC QN AUTO: 35.7 PG (ref 27–31)
MCHC RBC AUTO-ENTMCNC: 34 G/DL (ref 32–36)
MCV RBC AUTO: 105 FL (ref 82–98)
MONOCYTES # BLD AUTO: 0.4 K/UL (ref 0.3–1)
MONOCYTES NFR BLD: 4.8 % (ref 4–15)
NEUTROPHILS # BLD AUTO: 8.1 K/UL (ref 1.8–7.7)
NEUTROPHILS NFR BLD: 87.9 % (ref 38–73)
NRBC BLD-RTO: 0 /100 WBC
PLATELET # BLD AUTO: 210 K/UL (ref 150–450)
PMV BLD AUTO: 10.8 FL (ref 9.2–12.9)
POTASSIUM SERPL-SCNC: 3.9 MMOL/L (ref 3.5–5.1)
PROT SERPL-MCNC: 6.7 G/DL (ref 6–8.4)
PROTHROMBIN TIME: 12.5 SEC (ref 9–12.5)
RBC # BLD AUTO: 3.28 M/UL (ref 4–5.4)
SODIUM SERPL-SCNC: 135 MMOL/L (ref 136–145)
TROPONIN I SERPL DL<=0.01 NG/ML-MCNC: <0.006 NG/ML (ref 0–0.03)
TROPONIN I SERPL DL<=0.01 NG/ML-MCNC: <0.006 NG/ML (ref 0–0.03)
TSH SERPL DL<=0.005 MIU/L-ACNC: 0.91 UIU/ML (ref 0.4–4)
WBC # BLD AUTO: 9.23 K/UL (ref 3.9–12.7)

## 2022-02-21 PROCEDURE — 99284 EMERGENCY DEPT VISIT MOD MDM: CPT | Mod: 25

## 2022-02-21 PROCEDURE — 93010 EKG 12-LEAD: ICD-10-PCS | Mod: ,,, | Performed by: INTERNAL MEDICINE

## 2022-02-21 PROCEDURE — 3074F PR MOST RECENT SYSTOLIC BLOOD PRESSURE < 130 MM HG: ICD-10-PCS | Mod: CPTII,S$GLB,, | Performed by: PHYSICIAN ASSISTANT

## 2022-02-21 PROCEDURE — 1160F PR REVIEW ALL MEDS BY PRESCRIBER/CLIN PHARMACIST DOCUMENTED: ICD-10-PCS | Mod: CPTII,S$GLB,, | Performed by: PHYSICIAN ASSISTANT

## 2022-02-21 PROCEDURE — 84443 ASSAY THYROID STIM HORMONE: CPT | Performed by: NURSE PRACTITIONER

## 2022-02-21 PROCEDURE — 99214 PR OFFICE/OUTPT VISIT, EST, LEVL IV, 30-39 MIN: ICD-10-PCS | Mod: S$GLB,,, | Performed by: PHYSICIAN ASSISTANT

## 2022-02-21 PROCEDURE — 84484 ASSAY OF TROPONIN QUANT: CPT | Performed by: NURSE PRACTITIONER

## 2022-02-21 PROCEDURE — 93005 ELECTROCARDIOGRAM TRACING: CPT

## 2022-02-21 PROCEDURE — 99284 PR EMERGENCY DEPT VISIT,LEVEL IV: ICD-10-PCS | Mod: ,,, | Performed by: PHYSICIAN ASSISTANT

## 2022-02-21 PROCEDURE — 3078F PR MOST RECENT DIASTOLIC BLOOD PRESSURE < 80 MM HG: ICD-10-PCS | Mod: CPTII,S$GLB,, | Performed by: PHYSICIAN ASSISTANT

## 2022-02-21 PROCEDURE — 85025 COMPLETE CBC W/AUTO DIFF WBC: CPT | Performed by: NURSE PRACTITIONER

## 2022-02-21 PROCEDURE — 1159F MED LIST DOCD IN RCRD: CPT | Mod: CPTII,S$GLB,, | Performed by: PHYSICIAN ASSISTANT

## 2022-02-21 PROCEDURE — 99999 PR PBB SHADOW E&M-EST. PATIENT-LVL IV: CPT | Mod: PBBFAC,,, | Performed by: PHYSICIAN ASSISTANT

## 2022-02-21 PROCEDURE — 1160F RVW MEDS BY RX/DR IN RCRD: CPT | Mod: CPTII,S$GLB,, | Performed by: PHYSICIAN ASSISTANT

## 2022-02-21 PROCEDURE — 85610 PROTHROMBIN TIME: CPT | Performed by: PHYSICIAN ASSISTANT

## 2022-02-21 PROCEDURE — 99214 OFFICE O/P EST MOD 30 MIN: CPT | Mod: S$GLB,,, | Performed by: PHYSICIAN ASSISTANT

## 2022-02-21 PROCEDURE — 1101F PT FALLS ASSESS-DOCD LE1/YR: CPT | Mod: CPTII,S$GLB,, | Performed by: PHYSICIAN ASSISTANT

## 2022-02-21 PROCEDURE — 1159F PR MEDICATION LIST DOCUMENTED IN MEDICAL RECORD: ICD-10-PCS | Mod: CPTII,S$GLB,, | Performed by: PHYSICIAN ASSISTANT

## 2022-02-21 PROCEDURE — 99999 PR PBB SHADOW E&M-EST. PATIENT-LVL IV: ICD-10-PCS | Mod: PBBFAC,,, | Performed by: PHYSICIAN ASSISTANT

## 2022-02-21 PROCEDURE — 99284 EMERGENCY DEPT VISIT MOD MDM: CPT | Mod: ,,, | Performed by: PHYSICIAN ASSISTANT

## 2022-02-21 PROCEDURE — 1101F PR PT FALLS ASSESS DOC 0-1 FALLS W/OUT INJ PAST YR: ICD-10-PCS | Mod: CPTII,S$GLB,, | Performed by: PHYSICIAN ASSISTANT

## 2022-02-21 PROCEDURE — 3078F DIAST BP <80 MM HG: CPT | Mod: CPTII,S$GLB,, | Performed by: PHYSICIAN ASSISTANT

## 2022-02-21 PROCEDURE — 1125F PR PAIN SEVERITY QUANTIFIED, PAIN PRESENT: ICD-10-PCS | Mod: CPTII,S$GLB,, | Performed by: PHYSICIAN ASSISTANT

## 2022-02-21 PROCEDURE — 83880 ASSAY OF NATRIURETIC PEPTIDE: CPT | Performed by: NURSE PRACTITIONER

## 2022-02-21 PROCEDURE — 3074F SYST BP LT 130 MM HG: CPT | Mod: CPTII,S$GLB,, | Performed by: PHYSICIAN ASSISTANT

## 2022-02-21 PROCEDURE — 1125F AMNT PAIN NOTED PAIN PRSNT: CPT | Mod: CPTII,S$GLB,, | Performed by: PHYSICIAN ASSISTANT

## 2022-02-21 PROCEDURE — 93010 ELECTROCARDIOGRAM REPORT: CPT | Mod: ,,, | Performed by: INTERNAL MEDICINE

## 2022-02-21 PROCEDURE — 84484 ASSAY OF TROPONIN QUANT: CPT | Mod: 91 | Performed by: PHYSICIAN ASSISTANT

## 2022-02-21 PROCEDURE — 80053 COMPREHEN METABOLIC PANEL: CPT | Performed by: NURSE PRACTITIONER

## 2022-02-21 PROCEDURE — 3288F PR FALLS RISK ASSESSMENT DOCUMENTED: ICD-10-PCS | Mod: CPTII,S$GLB,, | Performed by: PHYSICIAN ASSISTANT

## 2022-02-21 PROCEDURE — 3288F FALL RISK ASSESSMENT DOCD: CPT | Mod: CPTII,S$GLB,, | Performed by: PHYSICIAN ASSISTANT

## 2022-02-21 NOTE — PROGRESS NOTES
Subjective:       Patient ID: Loida Martin is a 76 y.o. female.        Chief Complaint: Bleeding/Bruising (Bruising to the left back part of the leg and pain to the whole left side and tingling to both hands and shortness of breathe)    Loida Martin is an established patient of Gloria Echeverria MD here today for urgent care visit.    She has several bruises on legs, L>R.      She is followed by EP in [place], where she lives part time.  She has shortness of breath.  Rhythmol stopped 2/17 and started Multaq 2/18 and took for one day so two doses.  Immediately she felt terrible.  Acute onset of shortness of breath, palpitations.  Her hands were tingling this morning and lasted 5-10 minutes.  She monitored heart and rate up to 130 at one time and in afib, then reverted to sinus rhythm with episodes of tachycardia.      Taking metoprolol 75 mg BID and xarelto.  She was on metoprolol 25 mg BID but self titrated back to 75 mg daily over the weekend.      No chest pain.    Shortness of breath worsened by exertion.      Her afib is paroxysmal.      Ablation 10/2021.      She is on Climara patch.           Review of Systems   Constitutional: Negative for chills, diaphoresis, fatigue and fever.   HENT: Negative for congestion and sore throat.    Eyes: Negative for visual disturbance.   Respiratory: Positive for shortness of breath. Negative for cough and chest tightness.    Cardiovascular: Positive for palpitations. Negative for chest pain and leg swelling.   Gastrointestinal: Negative for abdominal pain, blood in stool, constipation, diarrhea, nausea and vomiting.   Genitourinary: Negative for dysuria, frequency, hematuria and urgency.   Musculoskeletal: Negative for arthralgias and back pain.   Skin: Negative for rash.   Neurological: Negative for dizziness, syncope, weakness and headaches.   Psychiatric/Behavioral: Negative for dysphoric mood and sleep disturbance. The patient is not nervous/anxious.       "  Objective:      Physical Exam  Vitals and nursing note reviewed.   Constitutional:       Appearance: Normal appearance. She is well-developed.   HENT:      Head: Normocephalic.      Right Ear: External ear normal.      Left Ear: External ear normal.   Eyes:      Pupils: Pupils are equal, round, and reactive to light.   Cardiovascular:      Rate and Rhythm: Normal rate and regular rhythm.      Heart sounds: Normal heart sounds. No murmur heard.    No friction rub. No gallop.      Comments: Irregular beats   Pulmonary:      Effort: Pulmonary effort is normal. No respiratory distress.      Breath sounds: Normal breath sounds.   Abdominal:      Palpations: Abdomen is soft.      Tenderness: There is no abdominal tenderness.   Skin:     General: Skin is warm and dry.             Comments: Bruises on legs    Neurological:      Mental Status: She is alert.         Assessment:       1. MONTES (dyspnea on exertion)    2. Paroxysmal atrial fibrillation    3. Personal history of malignant neoplasm of breast        Plan:       Loida was seen today for bleeding/bruising.    Diagnoses and all orders for this visit:    MONTES (dyspnea on exertion)  -     Refer to Emergency Dept.  -     Ambulatory referral/consult to Cardiac Electrophysiology; Future    Paroxysmal atrial fibrillation  -     Refer to Emergency Dept.  -     Ambulatory referral/consult to Cardiac Electrophysiology; Future    Personal history of malignant neoplasm of breast    Will refer to EP but needs emergent evaluation in ED first    Pt has been given instructions populated from [employer] database and has verbalized understanding of the after visit summary and information contained wherein.    Follow up with a primary care provider. May go to ER for acute shortness of breath, lightheadedness, fever, or any other emergent complaints or changes in condition.    "This note will be shared with the patient"    No future appointments.              "

## 2022-02-21 NOTE — ED PROVIDER NOTES
Encounter Date: 2022       History     Chief Complaint   Patient presents with    Shortness of Breath     Sent from im clinic hx afib, L thigh pain     75 yo F with paroxysmal Afib on Xarelto, HTN, thyroid disease presents to the ED sent from Urgent Care for further evaluation of dyspnea on exertion.  Patient states that she went to urgent care initially for bruising to her legs that she noticed the past couple of days.  She does report that she has had a shortness of breath intermittently related to her AFib.  However, her symptoms have been worse over the past few days.  She reports some associated fatigue.  Patient also reports pain in her left hip that radiates down the lateral thigh for the past couple of days.  Denies any injury or history of similar symptoms.  Of note, patient patient had a medication change 3 days ago. Her cardiologist stopped her on  and started Multaq . Since then she reports that she has intermittently gone into Afib with rates up to 130. Patient spends half of her time in [place] where she is established with Cardiology and electrophysiology.  Patient also complains of left hip pain that radiates down the lateral thigh.  Denies new leg swelling, calf pain, chest pain.         Review of patient's allergies indicates:   Allergen Reactions    Diltiazem Hives    Statins-hmg-coa reductase inhibitors      Other reaction(s): Myalgias (Muscle Pain)     Past Medical History:   Diagnosis Date    Hypertension     Thyroid disease      Past Surgical History:   Procedure Laterality Date    BREAST RECONSTRUCTION      HIP SURGERY      HYSTERECTOMY      JOINT REPLACEMENT      KNEE SURGERY      MASTECTOMY Bilateral     TONSILLECTOMY       No family history on file.  Social History     Tobacco Use    Smoking status: Former Smoker     Quit date:      Years since quittin.1    Smokeless tobacco: Never Used   Substance Use Topics    Alcohol use: Yes     Review of Systems    Constitutional: Positive for fatigue. Negative for fever.   HENT: Negative for sore throat.    Respiratory: Positive for shortness of breath.    Cardiovascular: Negative for chest pain.   Gastrointestinal: Negative for nausea.   Genitourinary: Negative for dysuria.   Musculoskeletal: Negative for back pain.   Skin: Negative for rash.   Neurological: Negative for weakness.   Hematological: Bruises/bleeds easily.       Physical Exam     Initial Vitals [02/21/22 1459]   BP Pulse Resp Temp SpO2   108/62 85 18 98.3 °F (36.8 °C) 98 %      MAP       --         Physical Exam    Nursing note and vitals reviewed.  Constitutional: She appears well-developed and well-nourished. She is not diaphoretic.  Non-toxic appearance. She does not appear ill. No distress.   HENT:   Head: Normocephalic and atraumatic.   Neck: Neck supple.   Cardiovascular: Normal rate and regular rhythm. Exam reveals no gallop and no friction rub.    No murmur heard.  Pulmonary/Chest: Effort normal and breath sounds normal. No accessory muscle usage. No tachypnea. No respiratory distress. She has no decreased breath sounds. She has no wheezes. She has no rhonchi. She has no rales.   Abdominal: She exhibits no distension.   Musculoskeletal:      Cervical back: Neck supple.      Comments: Area of ecchymosis noted to the posterior distal thigh.  Nontender.  Mild tenderness along the lateral thigh.  No edema to the lower legs.  Sparsely scattered petechiae to the lower legs.     Neurological: She is alert.   Skin: No rash noted.   Psychiatric: She has a normal mood and affect. Her behavior is normal.         ED Course   Procedures  Labs Reviewed   CBC W/ AUTO DIFFERENTIAL - Abnormal; Notable for the following components:       Result Value    RBC 3.28 (*)     Hemoglobin 11.7 (*)     Hematocrit 34.4 (*)      (*)     MCH 35.7 (*)     Immature Granulocytes 0.7 (*)     Gran # (ANC) 8.1 (*)     Immature Grans (Abs) 0.06 (*)     Lymph # 0.5 (*)     Gran %  87.9 (*)     Lymph % 5.3 (*)     All other components within normal limits   COMPREHENSIVE METABOLIC PANEL - Abnormal; Notable for the following components:    Sodium 135 (*)     Glucose 112 (*)     All other components within normal limits   B-TYPE NATRIURETIC PEPTIDE - Abnormal; Notable for the following components:     (*)     All other components within normal limits   TROPONIN I   PROTIME-INR   TROPONIN I          Imaging Results          X-Ray Chest AP Portable (Final result)  Result time 02/21/22 17:00:54    Final result by Fly Jones MD (02/21/22 17:00:54)                 Impression:      1. No acute cardiopulmonary process.      Electronically signed by: Fly Jones MD  Date:    02/21/2022  Time:    17:00             Narrative:    EXAMINATION:  XR CHEST AP PORTABLE    CLINICAL HISTORY:  Chest Pain;    TECHNIQUE:  Single frontal view of the chest was performed.    COMPARISON:  05/06/2021    FINDINGS:  The cardiomediastinal silhouette is not enlarged.  There is no pleural effusion.  The trachea is midline.  The lungs are symmetrically expanded bilaterally without evidence of acute parenchymal process. No large focal consolidation seen.  There is no pneumothorax.  The osseous structures are remarkable for degenerative changes.  Surgical change is noted of the proximal left humerus..                                 Medications - No data to display  Medical Decision Making:   History:   Old Medical Records: I decided to obtain old medical records.  Initial Assessment:   76-year-old female presents with shortness of breath and heart palpitations after a recent medication change.  Vitals remained stable.  Patient in no acute distress.  Differential Diagnosis:   My differential diagnosis includes but is not limited to:  Medication side effect, heart failure, AFib, anemia, pneumonia, electrolyte abnormality, thyroid abnormality  Independently Interpreted Test(s):   I have ordered and independently  interpreted EKG Reading(s) - see summary below       <> Summary of EKG Reading(s): NSR rate 83. No ectopy.  Normal intervals.  Normal axis.  No evidence of ischemia.  Clinical Tests:   Lab Tests: Ordered  Radiological Study: Ordered  Medical Tests: Ordered  ED Management:  BNP slightly elevated at 158. Troponins x2 negative. PE felt less likely as patient is already anticoagulated and vitals were stable.  No hypoxia.  Discussed briefly with Cardiology as patient is on an antiarrhythmic drug.  They feel that her medication is less likely the cause of her symptoms.  She may have paroxysmal episodes of AFib with RVR.  Patient can potentially increase her metoprolol.  I feel the patient is stable for discharge given stable vitals and normal workup.  I will place referral to electrophysiology.  Patient advised follow-up closely if her symptoms are not improving.  ED return precautions given.  Patient and spouse are comfortable with discharge plan.  I have reviewed the patient's records and discussed this case with my supervising physician.                         Clinical Impression:   Final diagnoses:  [R06.02] SOB (shortness of breath)  [R07.9] Chest pain                 Joana Munoz PA-C  02/22/22 5030

## 2022-02-21 NOTE — FIRST PROVIDER EVALUATION
Emergency Department TeleTriage Encounter Note      CHIEF COMPLAINT    Chief Complaint   Patient presents with    Shortness of Breath     Sent from im clinic hx afib, L thigh pain       VITAL SIGNS   Initial Vitals [02/21/22 1459]   BP Pulse Resp Temp SpO2   108/62 85 18 98.3 °F (36.8 °C) 98 %      MAP       --            ALLERGIES    Review of patient's allergies indicates:   Allergen Reactions    Diltiazem Hives    Statins-hmg-coa reductase inhibitors      Other reaction(s): Myalgias (Muscle Pain)       PROVIDER TRIAGE NOTE  This is a teletriage evaluation of a 76 y.o. female presenting to the ED complaining of SOB today.  Pmhx of afib.  Reports compliance with medications. Denies CP.     Initial orders will be placed and care will be transferred to an alternate provider when patient is roomed for a full evaluation. Any additional orders and the final disposition will be determined by that provider.           ORDERS  Labs Reviewed - No data to display    ED Orders (720h ago, onward)    Start Ordered     Status Ordering Provider    02/21/22 1554 02/21/22 1554  Vital signs  Every 15 min         Ordered ANAHI CLIFFORD N.    02/21/22 1554 02/21/22 1554  Cardiac Monitoring - Adult  Continuous        Comments: Notify Physician If:    Ordered ANAHI CLIFFORD N.    02/21/22 1554 02/21/22 1554  Pulse Oximetry Continuous  Continuous         Ordered ANAHI CLIFFORD N.    02/21/22 1554 02/21/22 1554  Diet NPO  Diet effective now         Ordered ANAHI CLIFFORD N.    02/21/22 1501 02/21/22 1501  EKG 12-lead  Once         Completed by SALVATORE ASHER on 2/21/2022 at  3:08 PM RUTH CORREA    Unscheduled 02/21/22 1554  Saline lock IV  Once         Ordered ANAHI CLIFFORD    Unscheduled 02/21/22 1554  EKG 12-lead  Once        Comments: Do not perform if previously done during this visit/ triage    Ordered ANAHI CLIFFORD    Unscheduled 02/21/22 1554  CBC auto differential   STAT         Ordered ABDIASALMITA ANAHI N.    Unscheduled 02/21/22 1554  Comprehensive metabolic panel  STAT         Ordered JANIAPATITO ANAHI N.    Unscheduled 02/21/22 1554  Troponin I #1  STAT         Ordered ABDIASALMITA ANAHI N.    Unscheduled 02/21/22 1554  B-Type natriuretic peptide (BNP)  STAT         Ordered ABDIASALMITA ANAHI N.    Unscheduled 02/21/22 1554  X-Ray Chest AP Portable  1 time imaging         Ordered ABDIASALMITA ANAHI N.            Virtual Visit Note: The provider triage portion of this emergency department evaluation and documentation was performed via [employer], a HIPAA-compliant telemedicine application, in concert with a tele-presenter in the room. A face to face patient evaluation with one of my colleagues will occur once the patient is placed in an emergency department room.      DISCLAIMER: This note was prepared with [employer] voice recognition transcription software. Garbled syntax, mangled pronouns, and other bizarre constructions may be attributed to that software system.

## 2022-02-22 DIAGNOSIS — I48.91 ATRIAL FIBRILLATION, UNSPECIFIED TYPE: Primary | ICD-10-CM

## 2022-02-23 ENCOUNTER — TELEPHONE (OUTPATIENT)
Dept: ELECTROPHYSIOLOGY | Facility: CLINIC | Age: 77
End: 2022-02-23
Payer: MEDICARE

## 2022-02-24 ENCOUNTER — HOSPITAL ENCOUNTER (OUTPATIENT)
Dept: CARDIOLOGY | Facility: CLINIC | Age: 77
Discharge: HOME | End: 2022-02-24
Payer: MEDICARE

## 2022-02-24 ENCOUNTER — OFFICE VISIT (OUTPATIENT)
Dept: ELECTROPHYSIOLOGY | Facility: CLINIC | Age: 77
End: 2022-02-24
Payer: MEDICARE

## 2022-02-24 ENCOUNTER — CLINICAL SUPPORT (OUTPATIENT)
Dept: CARDIOLOGY | Facility: HOSPITAL | Age: 77
End: 2022-02-24
Attending: INTERNAL MEDICINE
Payer: MEDICARE

## 2022-02-24 VITALS
WEIGHT: 143.06 LBS | DIASTOLIC BLOOD PRESSURE: 72 MMHG | SYSTOLIC BLOOD PRESSURE: 146 MMHG | HEART RATE: 71 BPM | HEIGHT: 65 IN | BODY MASS INDEX: 23.83 KG/M2

## 2022-02-24 DIAGNOSIS — R06.09 DOE (DYSPNEA ON EXERTION): ICD-10-CM

## 2022-02-24 DIAGNOSIS — I10 PRIMARY HYPERTENSION: ICD-10-CM

## 2022-02-24 DIAGNOSIS — I48.91 ATRIAL FIBRILLATION, UNSPECIFIED TYPE: ICD-10-CM

## 2022-02-24 DIAGNOSIS — I48.0 PAROXYSMAL ATRIAL FIBRILLATION: Primary | ICD-10-CM

## 2022-02-24 DIAGNOSIS — I48.0 PAROXYSMAL ATRIAL FIBRILLATION: ICD-10-CM

## 2022-02-24 PROCEDURE — 99999 PR PBB SHADOW E&M-EST. PATIENT-LVL IV: CPT | Mod: PBBFAC,,, | Performed by: INTERNAL MEDICINE

## 2022-02-24 PROCEDURE — 3077F SYST BP >= 140 MM HG: CPT | Mod: CPTII,S$GLB,, | Performed by: INTERNAL MEDICINE

## 2022-02-24 PROCEDURE — 3077F PR MOST RECENT SYSTOLIC BLOOD PRESSURE >= 140 MM HG: ICD-10-PCS | Mod: CPTII,S$GLB,, | Performed by: INTERNAL MEDICINE

## 2022-02-24 PROCEDURE — 99204 OFFICE O/P NEW MOD 45 MIN: CPT | Mod: S$GLB,,, | Performed by: INTERNAL MEDICINE

## 2022-02-24 PROCEDURE — 3078F PR MOST RECENT DIASTOLIC BLOOD PRESSURE < 80 MM HG: ICD-10-PCS | Mod: CPTII,S$GLB,, | Performed by: INTERNAL MEDICINE

## 2022-02-24 PROCEDURE — 1159F PR MEDICATION LIST DOCUMENTED IN MEDICAL RECORD: ICD-10-PCS | Mod: CPTII,S$GLB,, | Performed by: INTERNAL MEDICINE

## 2022-02-24 PROCEDURE — 1126F PR PAIN SEVERITY QUANTIFIED, NO PAIN PRESENT: ICD-10-PCS | Mod: CPTII,S$GLB,, | Performed by: INTERNAL MEDICINE

## 2022-02-24 PROCEDURE — 1159F MED LIST DOCD IN RCRD: CPT | Mod: CPTII,S$GLB,, | Performed by: INTERNAL MEDICINE

## 2022-02-24 PROCEDURE — 93010 ELECTROCARDIOGRAM REPORT: CPT | Mod: S$GLB,,, | Performed by: INTERNAL MEDICINE

## 2022-02-24 PROCEDURE — 1160F PR REVIEW ALL MEDS BY PRESCRIBER/CLIN PHARMACIST DOCUMENTED: ICD-10-PCS | Mod: CPTII,S$GLB,, | Performed by: INTERNAL MEDICINE

## 2022-02-24 PROCEDURE — 93248 CV CARDIAC MONITOR - 3-15 DAY ADULT (CUPID ONLY): ICD-10-PCS | Mod: ,,, | Performed by: INTERNAL MEDICINE

## 2022-02-24 PROCEDURE — 1160F RVW MEDS BY RX/DR IN RCRD: CPT | Mod: CPTII,S$GLB,, | Performed by: INTERNAL MEDICINE

## 2022-02-24 PROCEDURE — 3078F DIAST BP <80 MM HG: CPT | Mod: CPTII,S$GLB,, | Performed by: INTERNAL MEDICINE

## 2022-02-24 PROCEDURE — 99204 PR OFFICE/OUTPT VISIT, NEW, LEVL IV, 45-59 MIN: ICD-10-PCS | Mod: S$GLB,,, | Performed by: INTERNAL MEDICINE

## 2022-02-24 PROCEDURE — 1126F AMNT PAIN NOTED NONE PRSNT: CPT | Mod: CPTII,S$GLB,, | Performed by: INTERNAL MEDICINE

## 2022-02-24 PROCEDURE — 93005 ELECTROCARDIOGRAM TRACING: CPT | Mod: 59,S$GLB,, | Performed by: INTERNAL MEDICINE

## 2022-02-24 PROCEDURE — 99999 PR PBB SHADOW E&M-EST. PATIENT-LVL IV: ICD-10-PCS | Mod: PBBFAC,,, | Performed by: INTERNAL MEDICINE

## 2022-02-24 PROCEDURE — 93246 EXT ECG>7D<15D RECORDING: CPT

## 2022-02-24 PROCEDURE — 93010 RHYTHM STRIP: ICD-10-PCS | Mod: S$GLB,,, | Performed by: INTERNAL MEDICINE

## 2022-02-24 PROCEDURE — 93005 RHYTHM STRIP: ICD-10-PCS | Mod: 59,S$GLB,, | Performed by: INTERNAL MEDICINE

## 2022-02-24 PROCEDURE — 93248 EXT ECG>7D<15D REV&INTERPJ: CPT | Mod: ,,, | Performed by: INTERNAL MEDICINE

## 2022-02-24 RX ORDER — SOTALOL HYDROCHLORIDE 80 MG/1
80 TABLET ORAL 2 TIMES DAILY
Qty: 60 TABLET | Refills: 11 | Status: SHIPPED | OUTPATIENT
Start: 2022-02-24 | End: 2022-08-26 | Stop reason: SDUPTHER

## 2022-02-24 NOTE — PROGRESS NOTES
Subjective:    Patient ID:  Loida Martin is a 76 y.o. female who presents for evaluation of Atrial Fibrillation    Primary Care Physician: Gloria Echeverria MD  Referring Provider: Charley Chavarria PA-C    HPI  I had the pleasure of seeing Mrs. Martin today in our electrophysiology clinic in consultation for her atrial arrhythmia. As you are aware she is a pleasant 76 year-old woman with paroxysmal atrial fibrillation s/p cryo-PVI 8/2021, hypertension, and history of breast cancer. She spends half the year in [place] and half the year here in [place]. She has had paroxysmal AF for many years (diagnosed 10-12 years ago) that increased in frequency despite anti-arrhythmic drug therapy (propafenone and sotalol). She wore a monitor that showed a 13% AF burden.  She underwent cryo-PVI by Dr. Pepe Zepeda on 8/31/2021 at [facility]. She was recently switched from propafenone to multaq on 2/17/2021. She was recently seen in the ER for shortness of breath and palpitations. Her ECG noted sinus rhythm with a PVC. She feels her palpitations consistent with AF have worsened since starting multaq. She stopped it. She reports that at times she feels her heart rate increases and is stuck at 130 bpm. She recently developed an extensive bruise on the posterior aspect of her left thigh that has extended down to her calf. It is not worsening.    ECHO 5/2021 noted preserved LV function with normal LA size.  Treadmill stress ECG 1/2018 noted no ischemia.        Review of Systems   Constitutional: Negative for fever and malaise/fatigue.   HENT: Negative for congestion and sore throat.    Eyes: Negative for blurred vision and visual disturbance.   Cardiovascular: Positive for dyspnea on exertion, irregular heartbeat and palpitations. Negative for chest pain, near-syncope and syncope.   Respiratory: Negative for cough and shortness of breath.    Hematologic/Lymphatic: Negative for bleeding problem. Bruises/bleeds  easily.   Skin: Negative.    Musculoskeletal: Negative.    Gastrointestinal: Negative for bloating, abdominal pain, hematochezia and melena.   Neurological: Negative for focal weakness and weakness.   Psychiatric/Behavioral: Negative.         Objective:    Physical Exam  Vitals reviewed.   Constitutional:       General: She is not in acute distress.     Appearance: She is well-developed. She is not diaphoretic.   HENT:      Head: Normocephalic and atraumatic.   Eyes:      General:         Right eye: No discharge.         Left eye: No discharge.      Conjunctiva/sclera: Conjunctivae normal.   Cardiovascular:      Rate and Rhythm: Normal rate and regular rhythm.      Heart sounds: No murmur heard.    No friction rub. No gallop.   Pulmonary:      Effort: Pulmonary effort is normal. No respiratory distress.      Breath sounds: Normal breath sounds. No wheezing or rales.   Abdominal:      General: Bowel sounds are normal. There is no distension.      Palpations: Abdomen is soft.      Tenderness: There is no abdominal tenderness.   Musculoskeletal:         General: No swelling or tenderness.      Cervical back: Neck supple.      Right lower leg: No edema.      Left lower leg: No edema.   Skin:     General: Skin is warm and dry.      Findings: Bruising (aged bruise behind left thight down to calf) present.   Neurological:      Mental Status: She is alert and oriented to person, place, and time.   Psychiatric:         Behavior: Behavior normal.         Thought Content: Thought content normal.         Judgment: Judgment normal.           Assessment:       1. Paroxysmal atrial fibrillation    2. Primary hypertension    3. MONTES (dyspnea on exertion)         Plan:       In summary, Mrs. Martin is a pleasant 76 year-old woman with paroxysmal atrial fibrillation s/p cryo-PVI 8/2021, hypertension, and history of breast cancer who recently switched from propafenone to multaq for continued symptomatic paroxysmal AF and likely  flutter. I had a long discussion with the patient about the pathophysiology and risks of atrial fibrillation and its basic pathophysiology, including its health implications and treatment options. Specifically, I addressed the need for CVA (stroke) prophylaxis with aspirin versus oral anticoagulation (warfarin vs DOACs, discussed bleeding risks, and need to come to the ER for any head trauma for CT scanning even if asymptomatic). Her [id number] score is 4 and she is on anticoagulation.  I also discussed the goal to reduce symptomatic arrhythmic episodes by pharmacologic and/or procedural methods and utilizing a rhythm versus a rate control strategy. She desires further attempts at rhythm control. Discussed there is a 30-40% chance of requiring redo-ablations after a first PVI. Discussed redo-ablation versus trial of sotalol. Discussed also monitoring her for further evaluation as I suspect much of her arrhythmias are now atrial flutter. She is not opposed to redo-ablation. Discussed initiating sotalol inpatient versus outpatient. She has a normal ECG, no coronary artery disease and a structurally normal heart. I am comfortable initiating it as an outpatient. Discussed risks. She understood and is agreeable to doing it as an outpatient.    Plan  1.) Start 14 day holter today, 2/24/2022  2.) Start sotalol 80mg twice daily Saturday morning March 5th  3.) Get ECG March 7th to evaluate QT interval  4.) RTC in 4 weeks (ok to overbook) to discuss monitor and possible redo-ablation    Thank you for allowing me to participate in the care of this patient. Please do not hesitate to call me with any questions or concerns.    Erik Armando MD, PhD  Cardiac Electrophysiology

## 2022-02-24 NOTE — PATIENT INSTRUCTIONS
1.) Start monitor 2/24/2022  2.) Start sotalol 80mg twice daily Saturday morning March 5th  3.) Get ECG March 7th

## 2022-03-07 ENCOUNTER — HOSPITAL ENCOUNTER (OUTPATIENT)
Dept: CARDIOLOGY | Facility: CLINIC | Age: 77
Discharge: HOME | End: 2022-03-07
Payer: MEDICARE

## 2022-03-07 ENCOUNTER — TELEPHONE (OUTPATIENT)
Dept: ELECTROPHYSIOLOGY | Facility: CLINIC | Age: 77
End: 2022-03-07
Payer: MEDICARE

## 2022-03-07 DIAGNOSIS — I48.0 PAROXYSMAL ATRIAL FIBRILLATION: ICD-10-CM

## 2022-03-07 PROCEDURE — 93005 EKG 12-LEAD: ICD-10-PCS | Mod: S$GLB,,, | Performed by: INTERNAL MEDICINE

## 2022-03-07 PROCEDURE — 93010 EKG 12-LEAD: ICD-10-PCS | Mod: S$GLB,,, | Performed by: INTERNAL MEDICINE

## 2022-03-07 PROCEDURE — 93010 ELECTROCARDIOGRAM REPORT: CPT | Mod: S$GLB,,, | Performed by: INTERNAL MEDICINE

## 2022-03-07 PROCEDURE — 93005 ELECTROCARDIOGRAM TRACING: CPT | Mod: S$GLB,,, | Performed by: INTERNAL MEDICINE

## 2022-03-07 NOTE — TELEPHONE ENCOUNTER
Spoke with patient and advised that Dr Armando has reviewed her EKG and states that she can continue taking her Sotalol as prescribed. Patient verbalized understanding.

## 2022-03-07 NOTE — TELEPHONE ENCOUNTER
----- Message from Erik Armando MD sent at 3/7/2022  3:04 PM CST -----  Please let Mrs. Martin know she can continue sotalol.

## 2022-03-11 ENCOUNTER — TELEPHONE (OUTPATIENT)
Dept: ELECTROPHYSIOLOGY | Facility: CLINIC | Age: 77
End: 2022-03-11
Payer: MEDICARE

## 2022-03-11 NOTE — TELEPHONE ENCOUNTER
Spoke with patient and advised that Dr Armando has reviewed her holter monitor results which looked good and showed no atrial fibrillation during the time the monitor was worn, and he hopes the she is feeling better. Patient verbalized understanding of information provided.

## 2022-03-11 NOTE — TELEPHONE ENCOUNTER
----- Message from Gely Davenport MA sent at 3/11/2022 12:39 PM CST -----    ----- Message -----  From: Erik Armando MD  Sent: 3/11/2022  12:36 PM CST  To: [facility] Staff    Please notify the patient that her monitor showed no atrial fibrillation. Looked great. Hope she is feeling better

## 2022-03-11 NOTE — PROGRESS NOTES
Please notify the patient that her monitor showed no atrial fibrillation. Looked great. Hope she is feeling better

## 2022-03-28 ENCOUNTER — OFFICE VISIT (OUTPATIENT)
Dept: URGENT CARE | Facility: CLINIC | Age: 77
End: 2022-03-28
Payer: MEDICARE

## 2022-03-28 VITALS
HEART RATE: 72 BPM | BODY MASS INDEX: 23.32 KG/M2 | RESPIRATION RATE: 18 BRPM | OXYGEN SATURATION: 100 % | WEIGHT: 140 LBS | SYSTOLIC BLOOD PRESSURE: 104 MMHG | DIASTOLIC BLOOD PRESSURE: 67 MMHG | TEMPERATURE: 98 F | HEIGHT: 65 IN

## 2022-03-28 DIAGNOSIS — M10.9 ACUTE GOUT INVOLVING TOE OF LEFT FOOT, UNSPECIFIED CAUSE: Primary | ICD-10-CM

## 2022-03-28 PROBLEM — E78.2 MIXED HYPERLIPIDEMIA: Status: ACTIVE | Noted: 2019-02-19

## 2022-03-28 PROBLEM — T46.6X5A ADVERSE EFFECT OF HMG-COA REDUCTASE INHIBITOR: Status: ACTIVE | Noted: 2021-08-26

## 2022-03-28 PROBLEM — M85.80 SENILE OSTEOPENIA: Status: ACTIVE | Noted: 2019-03-01

## 2022-03-28 PROCEDURE — 1160F PR REVIEW ALL MEDS BY PRESCRIBER/CLIN PHARMACIST DOCUMENTED: ICD-10-PCS | Mod: CPTII,S$GLB,,

## 2022-03-28 PROCEDURE — 1125F AMNT PAIN NOTED PAIN PRSNT: CPT | Mod: CPTII,S$GLB,,

## 2022-03-28 PROCEDURE — 99203 PR OFFICE/OUTPT VISIT, NEW, LEVL III, 30-44 MIN: ICD-10-PCS | Mod: S$GLB,,,

## 2022-03-28 PROCEDURE — 1160F RVW MEDS BY RX/DR IN RCRD: CPT | Mod: CPTII,S$GLB,,

## 2022-03-28 PROCEDURE — 3074F SYST BP LT 130 MM HG: CPT | Mod: CPTII,S$GLB,,

## 2022-03-28 PROCEDURE — 1125F PR PAIN SEVERITY QUANTIFIED, PAIN PRESENT: ICD-10-PCS | Mod: CPTII,S$GLB,,

## 2022-03-28 PROCEDURE — 1159F PR MEDICATION LIST DOCUMENTED IN MEDICAL RECORD: ICD-10-PCS | Mod: CPTII,S$GLB,,

## 2022-03-28 PROCEDURE — 1159F MED LIST DOCD IN RCRD: CPT | Mod: CPTII,S$GLB,,

## 2022-03-28 PROCEDURE — 99203 OFFICE O/P NEW LOW 30 MIN: CPT | Mod: S$GLB,,,

## 2022-03-28 PROCEDURE — 3074F PR MOST RECENT SYSTOLIC BLOOD PRESSURE < 130 MM HG: ICD-10-PCS | Mod: CPTII,S$GLB,,

## 2022-03-28 PROCEDURE — 3078F PR MOST RECENT DIASTOLIC BLOOD PRESSURE < 80 MM HG: ICD-10-PCS | Mod: CPTII,S$GLB,,

## 2022-03-28 PROCEDURE — 3078F DIAST BP <80 MM HG: CPT | Mod: CPTII,S$GLB,,

## 2022-03-28 RX ORDER — ALLOPURINOL 100 MG/1
100 TABLET ORAL DAILY
COMMUNITY

## 2022-03-28 RX ORDER — COLCHICINE 0.6 MG/1
TABLET ORAL
Qty: 3 TABLET | Refills: 0 | Status: SHIPPED | OUTPATIENT
Start: 2022-03-28 | End: 2022-03-30

## 2022-03-28 NOTE — PATIENT INSTRUCTIONS
- Continue Allopurinol. Start Colchicine for flare up.     - Rest.    - Drink plenty of fluids.    - Acetaminophen (tylenol) or Ibuprofen (advil,motrin) as directed as needed for fever/pain. Avoid tylenol if you have a history of liver disease. Do not take ibuprofen if you have a history of GI bleeding, kidney disease, or if you take blood thinners.     - You must understand that you have received an Urgent Care treatment only and that you may be released before all of your medical problems are known or treated.   - You, the patient, will arrange for follow up care as instructed.   - If your condition worsens or fails to improve we recommend that you receive another evaluation at the ER immediately or contact your PCP to discuss your concerns or return here.   - Follow up with your PCP or specialty clinic as directed in the next 1-2 weeks if not improved or as needed.  You can call [phone] to schedule an appointment with the appropriate provider.    If your symptoms do not improve or worsen, go to the emergency room immediately.

## 2022-03-28 NOTE — PROGRESS NOTES
"Subjective:       Patient ID: Loida Martin is a 77 y.o. female.    Vitals:  height is 5' 5" (1.651 m) and weight is 63.5 kg (140 lb). Her temperature is 97.8 °F (36.6 °C). Her blood pressure is 104/67 and her pulse is 72. Her respiration is 18 and oxygen saturation is 100%.     Chief Complaint: Other Misc (Gout flare up - Entered by patient)    Patient presents with c.o left foot pain. Onset of symtpom 2 am this morning. Gives hx of gout which is managed with 100 mg of allopurinol. No improvement with medication. No fever.     Foot Injury   Incident onset: 11 hours  The incident occurred at home. There was no injury mechanism. The pain is present in the left foot. The quality of the pain is described as aching. The pain is at a severity of 8/10. The pain is moderate. The pain has been constant since onset. Pertinent negatives include no inability to bear weight or loss of motion. She reports no foreign bodies present. Treatments tried: allopurinol. The treatment provided no relief.       Constitution: Negative for chills, sweating, fatigue and fever.   HENT: Negative for ear pain, congestion, sinus pain and sinus pressure.    Neck: Negative for neck pain and neck stiffness.   Eyes: Negative for eye pain and eye redness.   Respiratory: Negative for cough, sputum production and bloody sputum.    Gastrointestinal: Negative for nausea and vomiting.   Musculoskeletal: Positive for pain, joint pain, joint swelling and gout. Negative for trauma and abnormal ROM of joint.   Skin: Negative for erythema and hives.   Allergic/Immunologic: Negative for hives and itching.   Neurological: Negative for disorientation and altered mental status.   Psychiatric/Behavioral: Negative for altered mental status, disorientation and confusion.       Objective:      Physical Exam   Constitutional: She is oriented to person, place, and time. She appears well-developed.   HENT:   Head: Normocephalic and atraumatic. Head is without " abrasion, without contusion and without laceration.   Ears:   Right Ear: External ear normal.   Left Ear: External ear normal.   Nose: Nose normal.   Mouth/Throat: Oropharynx is clear and moist and mucous membranes are normal.   Eyes: Conjunctivae, EOM and lids are normal. Pupils are equal, round, and reactive to light.   Neck: Trachea normal and phonation normal. Neck supple.   Cardiovascular: Normal rate, regular rhythm and normal heart sounds.   Pulmonary/Chest: Effort normal and breath sounds normal. No stridor. No respiratory distress.   Musculoskeletal: Normal range of motion.         General: Normal range of motion.      Left foot: Left great toe: There is tenderness of the MCP joint. Comments: Left MCP joint of great toe erythema and swelling present. There is pain with ROM of the great toe. Cap refill <2 sec. DP and PT pulses present.         Feet:    Neurological: She is alert and oriented to person, place, and time.   Skin: Skin is warm, dry, intact and no rash. Capillary refill takes less than 2 seconds. No abrasion, No burn, No bruising, No erythema and No ecchymosis   Psychiatric: Her speech is normal and behavior is normal. Judgment and thought content normal.   Nursing note and vitals reviewed.        Assessment:       1. Acute gout involving toe of left foot, unspecified cause          Plan:         Acute gout involving toe of left foot, unspecified cause  -     colchicine (COLCRYS) 0.6 mg tablet; Take 2 tablets (1.2 mg total) by mouth once daily for 1 day, THEN 1 tablet (0.6 mg total) once daily for 1 day. [1.2 mg PO x1, then 0.6 mg PO 1h later x1].  Dispense: 3 tablet; Refill: 0                  Patient Instructions   - Continue Allopurinol. Start Colchicine for flare up.     - Rest.    - Drink plenty of fluids.    - Acetaminophen (tylenol) or Ibuprofen (advil,motrin) as directed as needed for fever/pain. Avoid tylenol if you have a history of liver disease. Do not take ibuprofen if you have a  history of GI bleeding, kidney disease, or if you take blood thinners.     - You must understand that you have received an Urgent Care treatment only and that you may be released before all of your medical problems are known or treated.   - You, the patient, will arrange for follow up care as instructed.   - If your condition worsens or fails to improve we recommend that you receive another evaluation at the ER immediately or contact your PCP to discuss your concerns or return here.   - Follow up with your PCP or specialty clinic as directed in the next 1-2 weeks if not improved or as needed.  You can call [phone] to schedule an appointment with the appropriate provider.    If your symptoms do not improve or worsen, go to the emergency room immediately.

## 2022-03-29 ENCOUNTER — HOSPITAL ENCOUNTER (OUTPATIENT)
Dept: CARDIOLOGY | Facility: CLINIC | Age: 77
Discharge: HOME | End: 2022-03-29
Payer: MEDICARE

## 2022-03-29 ENCOUNTER — OFFICE VISIT (OUTPATIENT)
Dept: ELECTROPHYSIOLOGY | Facility: CLINIC | Age: 77
End: 2022-03-29
Payer: MEDICARE

## 2022-03-29 VITALS
HEIGHT: 65 IN | HEART RATE: 69 BPM | BODY MASS INDEX: 22.56 KG/M2 | DIASTOLIC BLOOD PRESSURE: 65 MMHG | SYSTOLIC BLOOD PRESSURE: 127 MMHG | WEIGHT: 135.38 LBS

## 2022-03-29 DIAGNOSIS — I48.0 PAROXYSMAL ATRIAL FIBRILLATION: Primary | ICD-10-CM

## 2022-03-29 DIAGNOSIS — I10 PRIMARY HYPERTENSION: ICD-10-CM

## 2022-03-29 DIAGNOSIS — I48.91 ATRIAL FIBRILLATION, UNSPECIFIED TYPE: ICD-10-CM

## 2022-03-29 DIAGNOSIS — R06.09 DYSPNEA ON EXERTION: ICD-10-CM

## 2022-03-29 PROCEDURE — 1160F RVW MEDS BY RX/DR IN RCRD: CPT | Mod: CPTII,S$GLB,, | Performed by: INTERNAL MEDICINE

## 2022-03-29 PROCEDURE — 93005 RHYTHM STRIP: ICD-10-PCS | Mod: S$GLB,,, | Performed by: INTERNAL MEDICINE

## 2022-03-29 PROCEDURE — 99999 PR PBB SHADOW E&M-EST. PATIENT-LVL III: ICD-10-PCS | Mod: PBBFAC,,, | Performed by: INTERNAL MEDICINE

## 2022-03-29 PROCEDURE — 1126F AMNT PAIN NOTED NONE PRSNT: CPT | Mod: CPTII,S$GLB,, | Performed by: INTERNAL MEDICINE

## 2022-03-29 PROCEDURE — 3074F PR MOST RECENT SYSTOLIC BLOOD PRESSURE < 130 MM HG: ICD-10-PCS | Mod: CPTII,S$GLB,, | Performed by: INTERNAL MEDICINE

## 2022-03-29 PROCEDURE — 93010 ELECTROCARDIOGRAM REPORT: CPT | Mod: S$GLB,,, | Performed by: INTERNAL MEDICINE

## 2022-03-29 PROCEDURE — 1159F PR MEDICATION LIST DOCUMENTED IN MEDICAL RECORD: ICD-10-PCS | Mod: CPTII,S$GLB,, | Performed by: INTERNAL MEDICINE

## 2022-03-29 PROCEDURE — 3074F SYST BP LT 130 MM HG: CPT | Mod: CPTII,S$GLB,, | Performed by: INTERNAL MEDICINE

## 2022-03-29 PROCEDURE — 3078F PR MOST RECENT DIASTOLIC BLOOD PRESSURE < 80 MM HG: ICD-10-PCS | Mod: CPTII,S$GLB,, | Performed by: INTERNAL MEDICINE

## 2022-03-29 PROCEDURE — 99999 PR PBB SHADOW E&M-EST. PATIENT-LVL III: CPT | Mod: PBBFAC,,, | Performed by: INTERNAL MEDICINE

## 2022-03-29 PROCEDURE — 93010 RHYTHM STRIP: ICD-10-PCS | Mod: S$GLB,,, | Performed by: INTERNAL MEDICINE

## 2022-03-29 PROCEDURE — 1126F PR PAIN SEVERITY QUANTIFIED, NO PAIN PRESENT: ICD-10-PCS | Mod: CPTII,S$GLB,, | Performed by: INTERNAL MEDICINE

## 2022-03-29 PROCEDURE — 3078F DIAST BP <80 MM HG: CPT | Mod: CPTII,S$GLB,, | Performed by: INTERNAL MEDICINE

## 2022-03-29 PROCEDURE — 1159F MED LIST DOCD IN RCRD: CPT | Mod: CPTII,S$GLB,, | Performed by: INTERNAL MEDICINE

## 2022-03-29 PROCEDURE — 1160F PR REVIEW ALL MEDS BY PRESCRIBER/CLIN PHARMACIST DOCUMENTED: ICD-10-PCS | Mod: CPTII,S$GLB,, | Performed by: INTERNAL MEDICINE

## 2022-03-29 PROCEDURE — 99214 OFFICE O/P EST MOD 30 MIN: CPT | Mod: S$GLB,,, | Performed by: INTERNAL MEDICINE

## 2022-03-29 PROCEDURE — 93005 ELECTROCARDIOGRAM TRACING: CPT | Mod: S$GLB,,, | Performed by: INTERNAL MEDICINE

## 2022-03-29 PROCEDURE — 99214 PR OFFICE/OUTPT VISIT, EST, LEVL IV, 30-39 MIN: ICD-10-PCS | Mod: S$GLB,,, | Performed by: INTERNAL MEDICINE

## 2022-03-29 NOTE — PROGRESS NOTES
Subjective:    Patient ID:  Loida Martin is a 77 y.o. female who presents for evaluation of Atrial Fibrillation    Primary Care Physician: Gloria Echeverria MD  Referring Provider: Charley Chavarria PA-C    HPI  Prior Hx:  I had the pleasure of seeing Mrs. Martin today in our electrophysiology clinic in consultation for her atrial arrhythmia. As you are aware she is a pleasant 77 year-old woman with paroxysmal atrial fibrillation s/p cryo-PVI 8/2021, hypertension, and history of breast cancer. She spends half the year in [place] and half the year here in [place]. She has had paroxysmal AF for many years (diagnosed 10-12 years ago) that increased in frequency despite anti-arrhythmic drug therapy (propafenone and sotalol). She wore a monitor that showed a 13% AF burden.  She underwent cryo-PVI by Dr. Pepe Zepeda on 8/31/2021 at [facility]. She was recently switched from propafenone to multaq on 2/17/2021. She was recently seen in the ER for shortness of breath and palpitations. Her ECG noted sinus rhythm with a PVC. She feels her palpitations consistent with AF have worsened since starting multaq. She stopped it. She reports that at times she feels her heart rate increases and is stuck at 130 bpm. She recently developed an extensive bruise on the posterior aspect of her left thigh that has extended down to her calf. It is not worsening.    ECHO 5/2021 noted preserved LV function with normal LA size.  Treadmill stress ECG 1/2018 noted no ischemia.    At our first visit in 2/2022 we started sotalol. She reports her main issues are related to shortness of breath with exertion walking upstairs. This predates her ablation. She lives in a 3 story home and reports having to stop after a flight and a half.    Interim Hx:  Mrs. Martin returns for follow-up. Symptomatically feels better on sotalol. Had a brief episode of AF lasting a few minutes. Continues to have inappropriate shortness of breath with  walking up stairs. A 14 day monitor showed no AF but had a 2% PVC burden.    My interpretation of today's in clinic electrocardiogram is sinus rhythm with a rate of 65 bpm with normal QT interval.         Review of Systems   Constitutional: Negative for fever and malaise/fatigue.   HENT: Negative for congestion and sore throat.    Eyes: Negative for blurred vision and visual disturbance.   Cardiovascular: Positive for dyspnea on exertion, irregular heartbeat and palpitations. Negative for chest pain, near-syncope and syncope.   Respiratory: Negative for cough and shortness of breath.    Hematologic/Lymphatic: Negative for bleeding problem. Bruises/bleeds easily.   Skin: Negative.    Musculoskeletal: Negative.    Gastrointestinal: Negative for bloating, abdominal pain, hematochezia and melena.   Neurological: Negative for focal weakness and weakness.   Psychiatric/Behavioral: Negative.         Objective:    Physical Exam  Vitals reviewed.   Constitutional:       General: She is not in acute distress.     Appearance: She is well-developed. She is not diaphoretic.   HENT:      Head: Normocephalic and atraumatic.   Eyes:      General:         Right eye: No discharge.         Left eye: No discharge.      Conjunctiva/sclera: Conjunctivae normal.   Cardiovascular:      Rate and Rhythm: Normal rate and regular rhythm.      Heart sounds: No murmur heard.    No friction rub. No gallop.   Pulmonary:      Effort: Pulmonary effort is normal. No respiratory distress.      Breath sounds: Normal breath sounds. No wheezing or rales.   Abdominal:      General: Bowel sounds are normal. There is no distension.      Palpations: Abdomen is soft.      Tenderness: There is no abdominal tenderness.   Musculoskeletal:         General: No swelling or tenderness.      Cervical back: Neck supple.      Right lower leg: No edema.      Left lower leg: No edema.   Skin:     General: Skin is warm and dry.      Findings: Bruising (aged bruise behind  left thight down to calf) present.   Neurological:      Mental Status: She is alert and oriented to person, place, and time.   Psychiatric:         Behavior: Behavior normal.         Thought Content: Thought content normal.         Judgment: Judgment normal.           Assessment:       1. Paroxysmal atrial fibrillation    2. Primary hypertension    3. Dyspnea on exertion         Plan:       In summary, Mrs. Martin is a pleasant 77 year-old woman with paroxysmal atrial fibrillation s/p cryo-PVI 8/2021, hypertension, and history of breast cancer who recently switched from propafenone to multaq for continued symptomatic paroxysmal AF and likely flutter. She is symptomatically improved on sotalol however still having dyspnea on exertion that she feels is not normal, particularly when walking up stairs. Her ECHO has shown PA pressure in the 30s with mild-moderate MR. Will order a stress echo to evaluate for worsening of MR/PA pressure with exercise and have her evaluated by Dr. Casarez. For now continue sotalol and xarelto.     RTC in 3 months      Thank you for allowing me to participate in the care of this patient. Please do not hesitate to call me with any questions or concerns.    Erik Armando MD, PhD  Cardiac Electrophysiology

## 2022-03-30 ENCOUNTER — HOSPITAL ENCOUNTER (OUTPATIENT)
Dept: CARDIOLOGY | Facility: HOSPITAL | Age: 77
Discharge: HOME | End: 2022-03-30
Attending: INTERNAL MEDICINE
Payer: MEDICARE

## 2022-03-30 VITALS — HEIGHT: 65 IN | WEIGHT: 135 LBS | BODY MASS INDEX: 22.49 KG/M2

## 2022-03-30 DIAGNOSIS — R06.09 DYSPNEA ON EXERTION: ICD-10-CM

## 2022-03-30 DIAGNOSIS — I48.0 PAROXYSMAL ATRIAL FIBRILLATION: ICD-10-CM

## 2022-03-30 LAB
ASCENDING AORTA: 3.05 CM
BSA FOR ECHO PROCEDURE: 1.68 M2
CV ECHO LV RWT: 0.36 CM
CV STRESS BASE HR: 73 BPM
DIASTOLIC BLOOD PRESSURE: 72 MMHG
DOP CALC LVOT AREA: 3.6 CM2
DOP CALC LVOT DIAMETER: 2.14 CM
DOP CALC LVOT PEAK VEL: 0.81 M/S
DOP CALC LVOT STROKE VOLUME: 53.42 CM3
DOP CALCLVOT PEAK VEL VTI: 14.86 CM
E WAVE DECELERATION TIME: 252.15 MSEC
E/A RATIO: 1.04
E/E' RATIO: 9.08 M/S
ECHO LV POSTERIOR WALL: 0.8 CM (ref 0.6–1.1)
EJECTION FRACTION: 58 %
FRACTIONAL SHORTENING: 32 % (ref 28–44)
INTERVENTRICULAR SEPTUM: 0.81 CM (ref 0.6–1.1)
IVRT: 114.18 MSEC
LA MAJOR: 5.9 CM
LA MINOR: 5.86 CM
LA WIDTH: 3.75 CM
LEFT ATRIUM SIZE: 4.01 CM
LEFT ATRIUM VOLUME INDEX: 45 ML/M2
LEFT ATRIUM VOLUME: 75.16 CM3
LEFT INTERNAL DIMENSION IN SYSTOLE: 3.04 CM (ref 2.1–4)
LEFT VENTRICLE DIASTOLIC VOLUME INDEX: 55.43 ML/M2
LEFT VENTRICLE DIASTOLIC VOLUME: 92.57 ML
LEFT VENTRICLE MASS INDEX: 69 G/M2
LEFT VENTRICLE SYSTOLIC VOLUME INDEX: 21.6 ML/M2
LEFT VENTRICLE SYSTOLIC VOLUME: 36.03 ML
LEFT VENTRICULAR INTERNAL DIMENSION IN DIASTOLE: 4.5 CM (ref 3.5–6)
LEFT VENTRICULAR MASS: 114.56 G
LV LATERAL E/E' RATIO: 7.38 M/S
LV SEPTAL E/E' RATIO: 11.8 M/S
MV A" WAVE DURATION": 11.13 MSEC
MV PEAK A VEL: 0.57 M/S
MV PEAK E VEL: 0.59 M/S
MV STENOSIS PRESSURE HALF TIME: 73.12 MS
MV VALVE AREA P 1/2 METHOD: 3.01 CM2
OHS CV CPX 1 MINUTE RECOVERY HEART RATE: 91 BPM
OHS CV CPX 85 PERCENT MAX PREDICTED HEART RATE MALE: 118
OHS CV CPX ESTIMATED METS: 12
OHS CV CPX MAX PREDICTED HEART RATE: 139
OHS CV CPX PATIENT IS FEMALE: 1
OHS CV CPX PATIENT IS MALE: 0
OHS CV CPX PEAK DIASTOLIC BLOOD PRESSURE: 64 MMHG
OHS CV CPX PEAK HEAR RATE: 118 BPM
OHS CV CPX PEAK RATE PRESSURE PRODUCT: NORMAL
OHS CV CPX PEAK SYSTOLIC BLOOD PRESSURE: 155 MMHG
OHS CV CPX PERCENT MAX PREDICTED HEART RATE ACHIEVED: 85
OHS CV CPX RATE PRESSURE PRODUCT PRESENTING: 8468
PISA TR MAX VEL: 2.17 M/S
PULM VEIN S/D RATIO: 1.09
PV PEAK D VEL: 0.45 M/S
PV PEAK S VEL: 0.49 M/S
RA MAJOR: 4.83 CM
RA PRESSURE: 3 MMHG
RA WIDTH: 3.18 CM
RIGHT VENTRICULAR END-DIASTOLIC DIMENSION: 3.22 CM
RV TISSUE DOPPLER FREE WALL SYSTOLIC VELOCITY 1 (APICAL 4 CHAMBER VIEW): 13.85 CM/S
SINUS: 3.33 CM
STJ: 2.1 CM
STRESS ECHO POST EXERCISE DUR MIN: 7 MINUTES
STRESS ECHO POST EXERCISE DUR SEC: 6 SECONDS
SYSTOLIC BLOOD PRESSURE: 116 MMHG
TDI LATERAL: 0.08 M/S
TDI SEPTAL: 0.05 M/S
TDI: 0.07 M/S
TR MAX PG: 19 MMHG
TRICUSPID ANNULAR PLANE SYSTOLIC EXCURSION: 2.77 CM
TV REST PULMONARY ARTERY PRESSURE: 22 MMHG

## 2022-03-30 PROCEDURE — 93351 STRESS ECHO (CUPID ONLY): ICD-10-PCS | Mod: 26,,, | Performed by: INTERNAL MEDICINE

## 2022-03-30 PROCEDURE — 93351 STRESS TTE COMPLETE: CPT | Mod: 26,,, | Performed by: INTERNAL MEDICINE

## 2022-03-30 PROCEDURE — 93351 STRESS TTE COMPLETE: CPT

## 2022-03-30 NOTE — PROGRESS NOTES
Please notify the patient that her heart function and stress test is normal. Dr. Casarez will review it to see if there were any changes in the pressures during exercise as we had discussed in clinic.

## 2022-04-01 ENCOUNTER — TELEPHONE (OUTPATIENT)
Dept: ELECTROPHYSIOLOGY | Facility: CLINIC | Age: 77
End: 2022-04-01
Payer: MEDICARE

## 2022-04-01 ENCOUNTER — PATIENT MESSAGE (OUTPATIENT)
Dept: ELECTROPHYSIOLOGY | Facility: CLINIC | Age: 77
End: 2022-04-01
Payer: MEDICARE

## 2022-04-01 NOTE — TELEPHONE ENCOUNTER
Attempted to contact patient to discuss results of stress test, but no answer received. Voicemail left advising patient that Dr Armando reviewed the results of her stess test which showed that her heart function and stress test were normal. Instructed that Dr Casarez will review the results to see if there were any changes in the pressures during exercise as discussed at the clinic visit. Advised to return call if she has any questions regarding the information provided.

## 2022-04-01 NOTE — TELEPHONE ENCOUNTER
----- Message from Erik Armando MD sent at 3/30/2022  3:28 PM CDT -----  Please notify the patient that her heart function and stress test is normal. Dr. Casarez will review it to see if there were any changes in the pressures during exercise as we had discussed in clinic.

## 2022-05-03 ENCOUNTER — HOSPITAL ENCOUNTER (OUTPATIENT)
Dept: RADIOLOGY | Facility: OTHER | Age: 77
Discharge: HOME | End: 2022-05-03
Attending: INTERNAL MEDICINE
Payer: MEDICARE

## 2022-05-03 DIAGNOSIS — R93.89 ABNORMAL CHEST X-RAY: ICD-10-CM

## 2022-05-03 PROCEDURE — 71046 XR CHEST PA AND LATERAL: ICD-10-PCS | Mod: 26,,, | Performed by: RADIOLOGY

## 2022-05-03 PROCEDURE — 71046 X-RAY EXAM CHEST 2 VIEWS: CPT | Mod: TC,FY

## 2022-05-03 PROCEDURE — 71046 X-RAY EXAM CHEST 2 VIEWS: CPT | Mod: 26,,, | Performed by: RADIOLOGY

## 2022-05-19 ENCOUNTER — PATIENT MESSAGE (OUTPATIENT)
Dept: RESEARCH | Facility: HOSPITAL | Age: 77
End: 2022-05-19
Payer: MEDICARE

## 2022-07-18 ENCOUNTER — PATIENT MESSAGE (OUTPATIENT)
Dept: CARDIOLOGY | Facility: CLINIC | Age: 77
End: 2022-07-18
Payer: MEDICARE

## 2022-08-26 ENCOUNTER — OFFICE VISIT (OUTPATIENT)
Dept: CARDIOLOGY | Facility: CLINIC | Age: 77
End: 2022-08-26
Payer: MEDICARE

## 2022-08-26 ENCOUNTER — LAB VISIT (OUTPATIENT)
Dept: LAB | Facility: HOSPITAL | Age: 77
End: 2022-08-26
Attending: INTERNAL MEDICINE
Payer: MEDICARE

## 2022-08-26 VITALS
DIASTOLIC BLOOD PRESSURE: 67 MMHG | BODY MASS INDEX: 23.07 KG/M2 | HEIGHT: 65 IN | HEART RATE: 72 BPM | SYSTOLIC BLOOD PRESSURE: 149 MMHG | WEIGHT: 138.44 LBS

## 2022-08-26 DIAGNOSIS — I48.0 PAROXYSMAL ATRIAL FIBRILLATION: ICD-10-CM

## 2022-08-26 DIAGNOSIS — I10 PRIMARY HYPERTENSION: ICD-10-CM

## 2022-08-26 DIAGNOSIS — E78.2 MIXED HYPERLIPIDEMIA: ICD-10-CM

## 2022-08-26 DIAGNOSIS — T46.6X5A ADVERSE EFFECT OF HMG-COA REDUCTASE INHIBITOR: ICD-10-CM

## 2022-08-26 DIAGNOSIS — R06.09 DYSPNEA ON EXERTION: Primary | ICD-10-CM

## 2022-08-26 LAB
ALBUMIN SERPL BCP-MCNC: 4.5 G/DL (ref 3.5–5.2)
ALP SERPL-CCNC: 58 U/L (ref 55–135)
ALT SERPL W/O P-5'-P-CCNC: 16 U/L (ref 10–44)
ANION GAP SERPL CALC-SCNC: 6 MMOL/L (ref 8–16)
AST SERPL-CCNC: 20 U/L (ref 10–40)
BASOPHILS # BLD AUTO: 0.02 K/UL (ref 0–0.2)
BASOPHILS NFR BLD: 0.3 % (ref 0–1.9)
BILIRUB SERPL-MCNC: 0.7 MG/DL (ref 0.1–1)
BNP SERPL-MCNC: 119 PG/ML (ref 0–99)
BUN SERPL-MCNC: 17 MG/DL (ref 8–23)
CALCIUM SERPL-MCNC: 9.8 MG/DL (ref 8.7–10.5)
CHLORIDE SERPL-SCNC: 100 MMOL/L (ref 95–110)
CO2 SERPL-SCNC: 27 MMOL/L (ref 23–29)
CREAT SERPL-MCNC: 0.9 MG/DL (ref 0.5–1.4)
DIFFERENTIAL METHOD: ABNORMAL
EOSINOPHIL # BLD AUTO: 0.2 K/UL (ref 0–0.5)
EOSINOPHIL NFR BLD: 2.7 % (ref 0–8)
ERYTHROCYTE [DISTWIDTH] IN BLOOD BY AUTOMATED COUNT: 12.3 % (ref 11.5–14.5)
EST. GFR  (NO RACE VARIABLE): >60 ML/MIN/1.73 M^2
GLUCOSE SERPL-MCNC: 95 MG/DL (ref 70–110)
HCT VFR BLD AUTO: 37.4 % (ref 37–48.5)
HGB BLD-MCNC: 13.5 G/DL (ref 12–16)
IMM GRANULOCYTES # BLD AUTO: 0.02 K/UL (ref 0–0.04)
IMM GRANULOCYTES NFR BLD AUTO: 0.3 % (ref 0–0.5)
LYMPHOCYTES # BLD AUTO: 1.8 K/UL (ref 1–4.8)
LYMPHOCYTES NFR BLD: 28.1 % (ref 18–48)
MCH RBC QN AUTO: 35.7 PG (ref 27–31)
MCHC RBC AUTO-ENTMCNC: 36.1 G/DL (ref 32–36)
MCV RBC AUTO: 99 FL (ref 82–98)
MONOCYTES # BLD AUTO: 0.5 K/UL (ref 0.3–1)
MONOCYTES NFR BLD: 8.4 % (ref 4–15)
NEUTROPHILS # BLD AUTO: 3.9 K/UL (ref 1.8–7.7)
NEUTROPHILS NFR BLD: 60.2 % (ref 38–73)
NRBC BLD-RTO: 0 /100 WBC
PLATELET # BLD AUTO: 176 K/UL (ref 150–450)
PMV BLD AUTO: 11.2 FL (ref 9.2–12.9)
POTASSIUM SERPL-SCNC: 4.3 MMOL/L (ref 3.5–5.1)
PROT SERPL-MCNC: 7.6 G/DL (ref 6–8.4)
RBC # BLD AUTO: 3.78 M/UL (ref 4–5.4)
SODIUM SERPL-SCNC: 133 MMOL/L (ref 136–145)
WBC # BLD AUTO: 6.41 K/UL (ref 3.9–12.7)

## 2022-08-26 PROCEDURE — 85025 COMPLETE CBC W/AUTO DIFF WBC: CPT | Performed by: INTERNAL MEDICINE

## 2022-08-26 PROCEDURE — 1159F PR MEDICATION LIST DOCUMENTED IN MEDICAL RECORD: ICD-10-PCS | Mod: CPTII,S$GLB,, | Performed by: INTERNAL MEDICINE

## 2022-08-26 PROCEDURE — 1101F PT FALLS ASSESS-DOCD LE1/YR: CPT | Mod: CPTII,S$GLB,, | Performed by: INTERNAL MEDICINE

## 2022-08-26 PROCEDURE — 1101F PR PT FALLS ASSESS DOC 0-1 FALLS W/OUT INJ PAST YR: ICD-10-PCS | Mod: CPTII,S$GLB,, | Performed by: INTERNAL MEDICINE

## 2022-08-26 PROCEDURE — 99999 PR PBB SHADOW E&M-EST. PATIENT-LVL IV: CPT | Mod: PBBFAC,,, | Performed by: INTERNAL MEDICINE

## 2022-08-26 PROCEDURE — 99204 PR OFFICE/OUTPT VISIT, NEW, LEVL IV, 45-59 MIN: ICD-10-PCS | Mod: S$GLB,,, | Performed by: INTERNAL MEDICINE

## 2022-08-26 PROCEDURE — 99204 OFFICE O/P NEW MOD 45 MIN: CPT | Mod: S$GLB,,, | Performed by: INTERNAL MEDICINE

## 2022-08-26 PROCEDURE — 3078F DIAST BP <80 MM HG: CPT | Mod: CPTII,S$GLB,, | Performed by: INTERNAL MEDICINE

## 2022-08-26 PROCEDURE — 3077F SYST BP >= 140 MM HG: CPT | Mod: CPTII,S$GLB,, | Performed by: INTERNAL MEDICINE

## 2022-08-26 PROCEDURE — 3288F FALL RISK ASSESSMENT DOCD: CPT | Mod: CPTII,S$GLB,, | Performed by: INTERNAL MEDICINE

## 2022-08-26 PROCEDURE — 3288F PR FALLS RISK ASSESSMENT DOCUMENTED: ICD-10-PCS | Mod: CPTII,S$GLB,, | Performed by: INTERNAL MEDICINE

## 2022-08-26 PROCEDURE — 1159F MED LIST DOCD IN RCRD: CPT | Mod: CPTII,S$GLB,, | Performed by: INTERNAL MEDICINE

## 2022-08-26 PROCEDURE — 1126F PR PAIN SEVERITY QUANTIFIED, NO PAIN PRESENT: ICD-10-PCS | Mod: CPTII,S$GLB,, | Performed by: INTERNAL MEDICINE

## 2022-08-26 PROCEDURE — 83880 ASSAY OF NATRIURETIC PEPTIDE: CPT | Performed by: INTERNAL MEDICINE

## 2022-08-26 PROCEDURE — 3078F PR MOST RECENT DIASTOLIC BLOOD PRESSURE < 80 MM HG: ICD-10-PCS | Mod: CPTII,S$GLB,, | Performed by: INTERNAL MEDICINE

## 2022-08-26 PROCEDURE — 36415 COLL VENOUS BLD VENIPUNCTURE: CPT | Performed by: INTERNAL MEDICINE

## 2022-08-26 PROCEDURE — 99999 PR PBB SHADOW E&M-EST. PATIENT-LVL IV: ICD-10-PCS | Mod: PBBFAC,,, | Performed by: INTERNAL MEDICINE

## 2022-08-26 PROCEDURE — 1126F AMNT PAIN NOTED NONE PRSNT: CPT | Mod: CPTII,S$GLB,, | Performed by: INTERNAL MEDICINE

## 2022-08-26 PROCEDURE — 80053 COMPREHEN METABOLIC PANEL: CPT | Performed by: INTERNAL MEDICINE

## 2022-08-26 PROCEDURE — 3077F PR MOST RECENT SYSTOLIC BLOOD PRESSURE >= 140 MM HG: ICD-10-PCS | Mod: CPTII,S$GLB,, | Performed by: INTERNAL MEDICINE

## 2022-08-26 RX ORDER — SOTALOL HYDROCHLORIDE 80 MG/1
80 TABLET ORAL 2 TIMES DAILY
Qty: 180 TABLET | Refills: 3 | Status: SHIPPED | OUTPATIENT
Start: 2022-08-26 | End: 2023-03-07

## 2022-08-27 NOTE — PROGRESS NOTES
Subjective:   Chief Complaint: Establish Care and Shortness of Breath  Last Clinic Visit: New Patient    History of Present Illness: Loida Martin is a 77 y.o. with paroxysmal atrial fibrillation, hypothyroidism and shortness of breath who presents for cardiology evaluation.  She was diagnosed with atrial fibrillation 10 years ago but had increasing burden despite propafenone and sotalol and had Cryo PVI 8/2021 in [facility].  Post ablation she had some break through on Propafenon and Multaq, Dr. Armando switched to sotalol and this was effective.  She had a follow up 14 day event monitor with no atrial fibrillation and 2% PVC burden. She continues to have persistent shortness of breath, Some question as to whether mitral regurgitation could be more significant, so she had a Stress echo with evaluation of MR at peak stress. MR did not worsen, PA pressures increased slightly to the 40s.  She exercised to 12 METs, Peak BP not severely elevated.  She has had CXR since ablation without dramatic diaphragm elevation. BNP slightly elevated 150s from February, mild anemia, normal renal function and troponin negative.  Her main issue, and that which she continues to deal with is that have significant shortness of breath,  has been taking lasix 20 daily not  as needed but on a daily basis and does not report dramatic improvement in shortness of breath with this. She is very active, exercising an hour a day, walking on a regular basis, Blood pressure at home 120s-140s, no weight gain, has never had shortness of breath at rest, no weight gain, denies any new weakness, can only go up two flights of stairs, and reports an extensive work up with pulmonologist in [place] - CT scan, PFTs, all of which were normal. She asked them to send us a copy of these records which we do not have.     Dx:  Paroxysmal Atrial Fibrillation - s/p Cryo PVI 8/2021  Gout  Hypothyroidism  Breast cancer  Shortness of breat    Medications:  Outpatient  "Encounter Medications as of 8/26/2022   Medication Sig Dispense Refill    allopurinoL (ZYLOPRIM) 100 MG tablet Take 100 mg by mouth once daily.      estradiol 0.05 mg/24 hr td ptwk 0.05 mg/24 hr PTWK Climara 0.05 mg/24 hr transdermal patch   APPLY 1 PATCH EVERY WEEK      furosemide (LASIX) 20 MG tablet Take 1 tablet (20 mg total) by mouth daily as needed (swelling). 30 tablet 0    levothyroxine (SYNTHROID) 75 MCG tablet Synthroid 75 mcg tablet   TAKE 1 TABLET DAILY      rivaroxaban (XARELTO) 20 mg Tab Xarelto 20 mg tablet   Take 1 tablet every day by oral route for 90 days.      [DISCONTINUED] sotaloL (BETAPACE) 80 MG tablet Take 1 tablet (80 mg total) by mouth 2 (two) times daily. 60 tablet 11    colchicine (COLCRYS) 0.6 mg tablet Take 2 tablets (1.2 mg total) by mouth once daily for 1 day, THEN 1 tablet (0.6 mg total) once daily for 1 day. [1.2 mg PO x1, then 0.6 mg PO 1h later x1]. 3 tablet 0     No facility-administered encounter medications on file as of 8/26/2022.     Social History:  Loida reports that she quit smoking about 37 years ago. She has never used smokeless tobacco. She reports current alcohol use.    Objective:   BP (!) 149/67 (BP Location: Left arm, Patient Position: Sitting, BP Method: Medium (Automatic))   Pulse 72   Ht 5' 5" (1.651 m)   Wt 62.8 kg (138 lb 7.2 oz)   BMI 23.04 kg/m²     Physical Exam   HENT:   Head: Normocephalic and atraumatic.   Mouth/Throat: Mucous membranes are moist.   Cardiovascular: Normal rate, regular rhythm and normal pulses. Exam reveals no gallop and no friction rub.   No murmur heard.  Pulmonary/Chest: Effort normal and breath sounds normal. No stridor. No respiratory distress. She has no rhonchi. She has no rales. She exhibits no tenderness.   Abdominal: Normal appearance. She exhibits no distension.   Musculoskeletal:      Right lower leg: No edema.      Left lower leg: No edema.   Neurological: She is alert.   Skin: Skin is warm. Capillary refill takes less " than 2 seconds.      EKG:  My independent visualization of most recent EKG is Normal sinus rhythm, non-specific ST changes.    TTE:  3/30/22  The test was stopped because the patient experienced shortness of breath.  The patient's exercise capacity was average.  During stress, the following significant arrhythmias were observed: occasional PVCs.  The ECG portion of this study is negative for myocardial ischemia.  The left ventricle is normal in size with normal systolic function.  The estimated ejection fraction is 58%.  Indeterminate left ventricular diastolic function.  Moderate left atrial enlargement.  Normal right ventricular size with normal right ventricular systolic function.  Mild right atrial enlargement.  Mild mitral regurgitation.  Normal central venous pressure (3 mmHg).  The estimated PA systolic pressure is 22 mmHg.  The stress echo portion of this study is negative for myocardial ischemia.  MR did not increase but PASP increased to the 40s.     Lipids:       Renal:  Recent Labs   Lab 08/26/22  1109   Creatinine 0.9   Potassium 4.3   CO2 27   BUN 17     Liver:  Recent Labs   Lab 08/26/22  1109   AST 20   ALT 16     Holter Monitor  03/09/22  Sinus rhythm  4 episodes of nonsustained AT lasting up to 5 beats in duration  2% PVC burden  No atrial fibrillation observed     Assessment:     1. Dyspnea on exertion    2. Paroxysmal atrial fibrillation    3. Adverse effect of HMG-CoA reductase inhibitor    4. Primary hypertension    5. Mixed hyperlipidemia      Plan:   1. Dyspnea on exertion  Very challenging situation trying to identify the patient's source of shortness of breath.  Rather extensive workup for shortness of breath as detailed above and nothing really sticks out.    -Will try to track down pulmonary records from [place]  -Will independently visualize echo for any clues  -Obtain PET stress for false negative stress echo  -With BNP being only mildly elevated finding, will check labs, if stable,  then consider lasix 40 mg daily  -rule out anemia ( slightly downtrend on last CBC)  -If all the above unremarkable consider CPX.  - Ambulatory referral/consult to Cardiology    2. Paroxysmal atrial fibrillation  Continues DOAC< stable  - Ambulatory referral/consult to Cardiology  - Cardiac PET Scan Stress; Future  - Comprehensive Metabolic Panel; Future  - CBC Auto Differential; Future  - BNP; Future    3. Adverse effect of HMG-CoA reductase inhibitor  Avoiding statins    4. Primary hypertension  Stable continue current meds    5. Mixed hyperlipidemia      Follow up in 6-8 wks      Darinel Casarez MD [facility]

## 2022-08-29 ENCOUNTER — PATIENT MESSAGE (OUTPATIENT)
Dept: CARDIOLOGY | Facility: CLINIC | Age: 77
End: 2022-08-29
Payer: MEDICARE

## 2022-08-29 DIAGNOSIS — I10 PRIMARY HYPERTENSION: Primary | ICD-10-CM

## 2022-08-29 RX ORDER — FUROSEMIDE 20 MG/1
40 TABLET ORAL DAILY
Qty: 180 TABLET | Refills: 3 | Status: SHIPPED | OUTPATIENT
Start: 2022-08-29 | End: 2023-08-29

## 2022-08-29 NOTE — TELEPHONE ENCOUNTER
Called patient, explained majority of lab work unremarkable, anemia improving, normal renal function, normal liver function, reduced sodium possibly secondary to furosemide will need to watch closely, would like to try short-term course of furosemide 40 mg daily for 3-4 days and she if she has any improvement in shortness of breath.  If no improvement then would return to 20, if improvement then will stay with 40 and will continue to watch electrolytes.    She will send us a message toward the end of this week.    -Darinel Casarez

## 2022-08-31 ENCOUNTER — TELEPHONE (OUTPATIENT)
Dept: INTERNAL MEDICINE | Facility: CLINIC | Age: 77
End: 2022-08-31
Payer: MEDICARE

## 2022-08-31 VITALS — SYSTOLIC BLOOD PRESSURE: 138 MMHG | DIASTOLIC BLOOD PRESSURE: 64 MMHG

## 2022-08-31 DIAGNOSIS — Z78.0 MENOPAUSE: ICD-10-CM

## 2022-09-04 ENCOUNTER — PATIENT MESSAGE (OUTPATIENT)
Dept: CARDIOLOGY | Facility: CLINIC | Age: 77
End: 2022-09-04
Payer: MEDICARE

## 2022-09-12 ENCOUNTER — PATIENT MESSAGE (OUTPATIENT)
Dept: CARDIOLOGY | Facility: CLINIC | Age: 77
End: 2022-09-12
Payer: MEDICARE

## 2022-10-10 ENCOUNTER — PATIENT MESSAGE (OUTPATIENT)
Dept: CARDIOLOGY | Facility: CLINIC | Age: 77
End: 2022-10-10
Payer: MEDICARE

## 2022-10-25 ENCOUNTER — OFFICE VISIT (OUTPATIENT)
Dept: CARDIOLOGY | Facility: CLINIC | Age: 77
End: 2022-10-25
Payer: MEDICARE

## 2022-10-25 VITALS
WEIGHT: 140 LBS | DIASTOLIC BLOOD PRESSURE: 60 MMHG | SYSTOLIC BLOOD PRESSURE: 124 MMHG | OXYGEN SATURATION: 98 % | HEIGHT: 65 IN | BODY MASS INDEX: 23.32 KG/M2 | HEART RATE: 70 BPM

## 2022-10-25 DIAGNOSIS — I10 PRIMARY HYPERTENSION: ICD-10-CM

## 2022-10-25 DIAGNOSIS — R06.09 DYSPNEA ON EXERTION: Primary | ICD-10-CM

## 2022-10-25 DIAGNOSIS — E78.2 MIXED HYPERLIPIDEMIA: ICD-10-CM

## 2022-10-25 DIAGNOSIS — I48.0 PAROXYSMAL ATRIAL FIBRILLATION: ICD-10-CM

## 2022-10-25 PROBLEM — I27.21 PULMONARY ARTERIAL HYPERTENSION: Status: RESOLVED | Noted: 2021-05-05 | Resolved: 2022-10-25

## 2022-10-25 PROCEDURE — 1101F PT FALLS ASSESS-DOCD LE1/YR: CPT | Mod: CPTII,S$GLB,, | Performed by: INTERNAL MEDICINE

## 2022-10-25 PROCEDURE — 1126F AMNT PAIN NOTED NONE PRSNT: CPT | Mod: CPTII,S$GLB,, | Performed by: INTERNAL MEDICINE

## 2022-10-25 PROCEDURE — 99214 PR OFFICE/OUTPT VISIT, EST, LEVL IV, 30-39 MIN: ICD-10-PCS | Mod: S$GLB,,, | Performed by: INTERNAL MEDICINE

## 2022-10-25 PROCEDURE — 1126F PR PAIN SEVERITY QUANTIFIED, NO PAIN PRESENT: ICD-10-PCS | Mod: CPTII,S$GLB,, | Performed by: INTERNAL MEDICINE

## 2022-10-25 PROCEDURE — 3074F SYST BP LT 130 MM HG: CPT | Mod: CPTII,S$GLB,, | Performed by: INTERNAL MEDICINE

## 2022-10-25 PROCEDURE — 3078F DIAST BP <80 MM HG: CPT | Mod: CPTII,S$GLB,, | Performed by: INTERNAL MEDICINE

## 2022-10-25 PROCEDURE — 99999 PR PBB SHADOW E&M-EST. PATIENT-LVL III: CPT | Mod: PBBFAC,,, | Performed by: INTERNAL MEDICINE

## 2022-10-25 PROCEDURE — 99214 OFFICE O/P EST MOD 30 MIN: CPT | Mod: S$GLB,,, | Performed by: INTERNAL MEDICINE

## 2022-10-25 PROCEDURE — 99999 PR PBB SHADOW E&M-EST. PATIENT-LVL III: ICD-10-PCS | Mod: PBBFAC,,, | Performed by: INTERNAL MEDICINE

## 2022-10-25 PROCEDURE — 1159F MED LIST DOCD IN RCRD: CPT | Mod: CPTII,S$GLB,, | Performed by: INTERNAL MEDICINE

## 2022-10-25 PROCEDURE — 1101F PR PT FALLS ASSESS DOC 0-1 FALLS W/OUT INJ PAST YR: ICD-10-PCS | Mod: CPTII,S$GLB,, | Performed by: INTERNAL MEDICINE

## 2022-10-25 PROCEDURE — 1159F PR MEDICATION LIST DOCUMENTED IN MEDICAL RECORD: ICD-10-PCS | Mod: CPTII,S$GLB,, | Performed by: INTERNAL MEDICINE

## 2022-10-25 PROCEDURE — 3074F PR MOST RECENT SYSTOLIC BLOOD PRESSURE < 130 MM HG: ICD-10-PCS | Mod: CPTII,S$GLB,, | Performed by: INTERNAL MEDICINE

## 2022-10-25 PROCEDURE — 3288F FALL RISK ASSESSMENT DOCD: CPT | Mod: CPTII,S$GLB,, | Performed by: INTERNAL MEDICINE

## 2022-10-25 PROCEDURE — 3288F PR FALLS RISK ASSESSMENT DOCUMENTED: ICD-10-PCS | Mod: CPTII,S$GLB,, | Performed by: INTERNAL MEDICINE

## 2022-10-25 PROCEDURE — 3078F PR MOST RECENT DIASTOLIC BLOOD PRESSURE < 80 MM HG: ICD-10-PCS | Mod: CPTII,S$GLB,, | Performed by: INTERNAL MEDICINE

## 2022-10-25 RX ORDER — MINOXIDIL 10 MG/1
2.5 TABLET ORAL DAILY
COMMUNITY

## 2022-10-25 NOTE — Clinical Note
Can we send a copy of this note to Dr. Iván Mederos in [facility]?  Thank you! Phone: [phone]

## 2022-10-25 NOTE — PROGRESS NOTES
Subjective:   Chief Complaint: Follow-up, Establish Care, Results, and Shortness of Breath  Last Clinic Visit: 8/26/2022     History of Present Illness: Loida Martin is a 77 y.o. with paroxysmal atrial fibrillation, hypothyroidism and shortness of breath who presents for shortness of breath.    Interval History:  Since we saw her last she went on a trip via boat to [facility] and [place].  Reported similar symptoms that she has had before, dyspnea on exertion with ambulation.  Denies any focal muscular weakness, no new neurological symptoms.  Since we saw her she saw Dr. Levy in [place] and had a cardiac PET performed which showed no significant ischemia or infarct perfusion abnormalities, and mildly reduced stress flow mildly reduced CFR, no significant abnormalities.  We screened for anemia, electrolytes were all grossly normal, mildly elevated BNP, we tried doubling Lasix empirically for 2 weeks despite small IVC, and no improvement here either.  We also obtained records from her pulmonologist in [place] which showed normal PFTs, 2 times over the past 2 years.  She has a primary care doctor in [place], Dr. Iván Mederos.    8/26/2022  She was diagnosed with atrial fibrillation 10 years ago but had increasing burden despite propafenone and sotalol and had Cryo PVI 8/2021 in [place].  Post ablation she had some break through on Propafenon and Multaq, Dr. Armando switched to sotalol and this was effective.  She had a follow up 14 day event monitor with no atrial fibrillation and 2% PVC burden. She continues to have persistent shortness of breath, Some question as to whether mitral regurgitation could be more significant, so she had a Stress echo with evaluation of MR at peak stress. MR did not worsen, PA pressures increased slightly to the 40s.  She exercised to 12 METs, Peak BP not severely elevated.  She has had CXR since ablation without dramatic diaphragm elevation. BNP slightly elevated 150s from  February, mild anemia, normal renal function and troponin negative.  Her main issue, and that which she continues to deal with is that have significant shortness of breath,  has been taking lasix 20 daily not  as needed but on a daily basis and does not report dramatic improvement in shortness of breath with this. She is very active, exercising an hour a day, walking on a regular basis, Blood pressure at home 120s-140s, no weight gain, has never had shortness of breath at rest, no weight gain, denies any new weakness, can only go up two flights of stairs, and reports an extensive work up with pulmonologist in [place] - CT scan, PFTs, all of which were normal. She asked them to send us a copy of these records which we do not have.     Dx:  Paroxysmal Atrial Fibrillation - s/p Cryo PVI 8/2021  Gout  Hypothyroidism  Breast cancer  Shortness of breath    Medications:  Outpatient Encounter Medications as of 10/25/2022   Medication Sig Dispense Refill    allopurinoL (ZYLOPRIM) 100 MG tablet Take 100 mg by mouth once daily.      colchicine (COLCRYS) 0.6 mg tablet Take 2 tablets (1.2 mg total) by mouth once daily for 1 day, THEN 1 tablet (0.6 mg total) once daily for 1 day. [1.2 mg PO x1, then 0.6 mg PO 1h later x1]. 3 tablet 0    estradiol 0.05 mg/24 hr td ptwk 0.05 mg/24 hr PTWK Climara 0.05 mg/24 hr transdermal patch   APPLY 1 PATCH EVERY WEEK      furosemide (LASIX) 20 MG tablet Take 1 tablet (20 mg total) by mouth daily as needed (swelling). 30 tablet 0    furosemide (LASIX) 20 MG tablet Take 2 tablets (40 mg total) by mouth once daily. 180 tablet 3    levothyroxine (SYNTHROID) 75 MCG tablet Synthroid 75 mcg tablet   TAKE 1 TABLET DAILY      minoxidiL (LONITEN) 10 MG Tab Take 2.5 mg by mouth once daily.      rivaroxaban (XARELTO) 20 mg Tab Xarelto 20 mg tablet   Take 1 tablet every day by oral route for 90 days.      sotaloL (BETAPACE) 80 MG tablet Take 1 tablet (80 mg total) by mouth 2 (two) times daily. 180 tablet 3  "    No facility-administered encounter medications on file as of 10/25/2022.     Social History:  Loida reports that she quit smoking about 37 years ago. She has never used smokeless tobacco. She reports current alcohol use.    Objective:   /60 (BP Location: Left arm, Patient Position: Sitting, BP Method: Medium (Automatic))   Pulse 70   Ht 5' 5" (1.651 m)   Wt 63.5 kg (139 lb 15.9 oz)   SpO2 98%   BMI 23.30 kg/m²     Physical Exam   HENT:   Head: Normocephalic and atraumatic.   Mouth/Throat: Mucous membranes are moist.   Cardiovascular: Normal rate, regular rhythm and normal pulses. Exam reveals no gallop and no friction rub.   No murmur heard.  Pulmonary/Chest: Effort normal and breath sounds normal. No stridor. No respiratory distress. She has no rhonchi. She has no rales. She exhibits no tenderness.   Abdominal: Normal appearance. She exhibits no distension.   Musculoskeletal:      Right lower leg: No edema.      Left lower leg: No edema.   Neurological: She is alert.   Skin: Skin is warm. Capillary refill takes less than 2 seconds.      EKG:  My independent visualization of most recent EKG is Normal sinus rhythm, non-specific ST changes.    TTE:  3/30/22  The test was stopped because the patient experienced shortness of breath.  The patient's exercise capacity was average.  During stress, the following significant arrhythmias were observed: occasional PVCs.  The ECG portion of this study is negative for myocardial ischemia.  The left ventricle is normal in size with normal systolic function.  The estimated ejection fraction is 58%.  Indeterminate left ventricular diastolic function.  Moderate left atrial enlargement.  Normal right ventricular size with normal right ventricular systolic function.  Mild right atrial enlargement.  Mild mitral regurgitation.  Normal central venous pressure (3 mmHg).  The estimated PA systolic pressure is 22 mmHg.  The stress echo portion of this study is negative for " myocardial ischemia.  MR did not increase but PASP increased to the 40s.     Lipids:       Renal:  Recent Labs   Lab 08/26/22  1109   Creatinine 0.9   Potassium 4.3   CO2 27   BUN 17       Liver:  Recent Labs   Lab 08/26/22  1109   AST 20   ALT 16       Holter Monitor  03/09/22  Sinus rhythm  4 episodes of nonsustained AT lasting up to 5 beats in duration  2% PVC burden  No atrial fibrillation observed     Assessment:     1. Dyspnea on exertion    2. Primary hypertension    3. Mixed hyperlipidemia    4. Paroxysmal atrial fibrillation      Plan:   1. Dyspnea on exertion  At this point she continues to have significant dyspnea on exertion with ambulation, however was able to achieve a normal exercise capacity on recent stress test.  Have ruled out significant anemia, now complete pulmonary evaluation in [place], in addition to cardiac PET with no evidence of any significant ischemia, normal myocardial blood flow.    Given the extent of her workup thus far from a pulmonary or cardiac perspective, without any significant appreciable abnormalities, would recommend re-evaluation of shortness of breath either with PCP or neurologist for any neuromuscular etiology.  Offered her referral to Neurology here she would like to discuss with her primary prior.    2. Primary hypertension  Well controlled    3. Mixed hyperlipidemia  Continue diet and exercise    4. Paroxysmal atrial fibrillation  Tolerating anticoagulation well, chronotropic competence demonstrated on stress test, confirmed on monitoring that shortness of breath does not correlate with atrial fibrillation, no significant bleeding.      Follow-up p.r.n.      Darinel Casarez MD [facility]

## 2022-12-30 NOTE — TELEPHONE ENCOUNTER
----- Message from Megan Parsons sent at 10/6/2020 11:05 AM CDT -----  [phone]-No one has called with MRI results and she did not want to make appointment, please call her     Low Dose Naltrexone Pregnancy And Lactation Text: Naltrexone is pregnancy category C.  There have been no adequate and well-controlled studies in pregnant women.  It should be used in pregnancy only if the potential benefit justifies the potential risk to the fetus.   Limited data indicates that naltrexone is minimally excreted into breastmilk.

## 2023-01-19 ENCOUNTER — OFFICE VISIT (OUTPATIENT)
Dept: URGENT CARE | Facility: CLINIC | Age: 78
End: 2023-01-19
Payer: MEDICARE

## 2023-01-19 VITALS
HEIGHT: 65 IN | BODY MASS INDEX: 23.16 KG/M2 | OXYGEN SATURATION: 98 % | WEIGHT: 139 LBS | RESPIRATION RATE: 20 BRPM | DIASTOLIC BLOOD PRESSURE: 74 MMHG | SYSTOLIC BLOOD PRESSURE: 115 MMHG | HEART RATE: 85 BPM | TEMPERATURE: 98 F

## 2023-01-19 DIAGNOSIS — J20.9 ACUTE BRONCHITIS, UNSPECIFIED ORGANISM: Primary | ICD-10-CM

## 2023-01-19 DIAGNOSIS — R05.1 ACUTE COUGH: ICD-10-CM

## 2023-01-19 PROBLEM — M10.9 GOUT: Status: ACTIVE | Noted: 2022-03-30

## 2023-01-19 LAB
CTP QC/QA: YES
SARS-COV-2 AG RESP QL IA.RAPID: NEGATIVE

## 2023-01-19 PROCEDURE — 3078F DIAST BP <80 MM HG: CPT | Mod: CPTII,S$GLB,, | Performed by: PHYSICIAN ASSISTANT

## 2023-01-19 PROCEDURE — 1125F PR PAIN SEVERITY QUANTIFIED, PAIN PRESENT: ICD-10-PCS | Mod: CPTII,S$GLB,, | Performed by: PHYSICIAN ASSISTANT

## 2023-01-19 PROCEDURE — 3078F PR MOST RECENT DIASTOLIC BLOOD PRESSURE < 80 MM HG: ICD-10-PCS | Mod: CPTII,S$GLB,, | Performed by: PHYSICIAN ASSISTANT

## 2023-01-19 PROCEDURE — 1159F MED LIST DOCD IN RCRD: CPT | Mod: CPTII,S$GLB,, | Performed by: PHYSICIAN ASSISTANT

## 2023-01-19 PROCEDURE — 99213 PR OFFICE/OUTPT VISIT, EST, LEVL III, 20-29 MIN: ICD-10-PCS | Mod: S$GLB,,, | Performed by: PHYSICIAN ASSISTANT

## 2023-01-19 PROCEDURE — 99213 OFFICE O/P EST LOW 20 MIN: CPT | Mod: S$GLB,,, | Performed by: PHYSICIAN ASSISTANT

## 2023-01-19 PROCEDURE — 1159F PR MEDICATION LIST DOCUMENTED IN MEDICAL RECORD: ICD-10-PCS | Mod: CPTII,S$GLB,, | Performed by: PHYSICIAN ASSISTANT

## 2023-01-19 PROCEDURE — 87811 SARS-COV-2 COVID19 W/OPTIC: CPT | Mod: QW,S$GLB,, | Performed by: PHYSICIAN ASSISTANT

## 2023-01-19 PROCEDURE — 87811 SARS CORONAVIRUS 2 ANTIGEN POCT, MANUAL READ: ICD-10-PCS | Mod: QW,S$GLB,, | Performed by: PHYSICIAN ASSISTANT

## 2023-01-19 PROCEDURE — 3074F PR MOST RECENT SYSTOLIC BLOOD PRESSURE < 130 MM HG: ICD-10-PCS | Mod: CPTII,S$GLB,, | Performed by: PHYSICIAN ASSISTANT

## 2023-01-19 PROCEDURE — 1160F RVW MEDS BY RX/DR IN RCRD: CPT | Mod: CPTII,S$GLB,, | Performed by: PHYSICIAN ASSISTANT

## 2023-01-19 PROCEDURE — 1160F PR REVIEW ALL MEDS BY PRESCRIBER/CLIN PHARMACIST DOCUMENTED: ICD-10-PCS | Mod: CPTII,S$GLB,, | Performed by: PHYSICIAN ASSISTANT

## 2023-01-19 PROCEDURE — 3074F SYST BP LT 130 MM HG: CPT | Mod: CPTII,S$GLB,, | Performed by: PHYSICIAN ASSISTANT

## 2023-01-19 PROCEDURE — 1125F AMNT PAIN NOTED PAIN PRSNT: CPT | Mod: CPTII,S$GLB,, | Performed by: PHYSICIAN ASSISTANT

## 2023-01-19 RX ORDER — CETIRIZINE HYDROCHLORIDE 10 MG/1
10 TABLET ORAL DAILY
Qty: 30 TABLET | Refills: 0 | Status: SHIPPED | OUTPATIENT
Start: 2023-01-19 | End: 2023-02-18

## 2023-01-19 RX ORDER — BENZONATATE 200 MG/1
200 CAPSULE ORAL 3 TIMES DAILY PRN
Qty: 30 CAPSULE | Refills: 0 | Status: SHIPPED | OUTPATIENT
Start: 2023-01-19 | End: 2023-01-29

## 2023-01-19 NOTE — PROGRESS NOTES
"Subjective:       Patient ID: Loida Martin is a 77 y.o. female.    Vitals:  height is 5' 5" (1.651 m) and weight is 63 kg (139 lb). Her temperature is 98.3 °F (36.8 °C). Her blood pressure is 115/74 and her pulse is 85. Her respiration is 20 and oxygen saturation is 98%.     Chief Complaint: Cough    Pt presents with complaint of cough and chest wheezing.  Pt states her cough has been ongoing since Tuesday and states her chest wheezing started last night.  Pt states she has taken mucinex for her symptoms.  Pt states her cough is dry and states she is not experiencing any other symptoms    Cough  This is a new problem. The current episode started in the past 7 days. The problem has been unchanged. The problem occurs every few minutes. The cough is Non-productive. Pertinent negatives include no chest pain, chills, ear pain, fever, myalgias, postnasal drip, rash, sore throat or wheezing. Nothing aggravates the symptoms. Treatments tried: mucinex. The treatment provided no relief.     Constitution: Negative for appetite change, chills, sweating, fatigue, fever and unexpected weight change.   HENT:  Positive for sinus pressure. Negative for ear pain, ear discharge, mouth sores, tongue pain, tongue lesion, facial swelling, congestion, postnasal drip, sinus pain, sore throat, trouble swallowing and voice change.    Neck: Negative for neck pain, neck stiffness and painful lymph nodes.   Cardiovascular:  Negative for chest pain and sob on exertion.   Eyes:  Negative for eye discharge and eye itching.   Respiratory:  Positive for cough. Negative for chest tightness, sputum production and wheezing.         SOB after coughing   Gastrointestinal:  Negative for abdominal pain, nausea, vomiting, constipation and diarrhea.   Musculoskeletal:  Negative for pain, muscle cramps and muscle ache.   Skin:  Negative for color change, pale and rash.   Neurological:  Negative for disorientation and altered mental status. "   Hematologic/Lymphatic: Negative for swollen lymph nodes.   Psychiatric/Behavioral:  Negative for altered mental status, disorientation and confusion.    Past Medical History:   Diagnosis Date    Gout, unspecified     Hypertension     Thyroid disease        Past Surgical History:   Procedure Laterality Date    BREAST RECONSTRUCTION      HIP SURGERY      HYSTERECTOMY      JOINT REPLACEMENT      KNEE SURGERY      MASTECTOMY Bilateral     TONSILLECTOMY         History reviewed. No pertinent family history.    Social History     Socioeconomic History    Marital status:    Tobacco Use    Smoking status: Former     Types: Cigarettes     Quit date:      Years since quittin.0    Smokeless tobacco: Never   Substance and Sexual Activity    Alcohol use: Yes       Current Outpatient Medications   Medication Sig Dispense Refill    allopurinoL (ZYLOPRIM) 100 MG tablet Take 100 mg by mouth once daily.      estradiol 0.05 mg/24 hr td ptwk 0.05 mg/24 hr PTWK [place] 0.05 mg/24 hr transdermal patch   APPLY 1 PATCH EVERY WEEK      furosemide (LASIX) 20 MG tablet Take 1 tablet (20 mg total) by mouth daily as needed (swelling). 30 tablet 0    furosemide (LASIX) 20 MG tablet Take 2 tablets (40 mg total) by mouth once daily. 180 tablet 3    levothyroxine (SYNTHROID) 75 MCG tablet Synthroid 75 mcg tablet   TAKE 1 TABLET DAILY      minoxidiL (LONITEN) 10 MG Tab Take 2.5 mg by mouth once daily.      rivaroxaban (XARELTO) 20 mg Tab Xarelto 20 mg tablet   Take 1 tablet every day by oral route for 90 days.      sotaloL (BETAPACE) 80 MG tablet Take 1 tablet (80 mg total) by mouth 2 (two) times daily. 180 tablet 3    colchicine (COLCRYS) 0.6 mg tablet Take 2 tablets (1.2 mg total) by mouth once daily for 1 day, THEN 1 tablet (0.6 mg total) once daily for 1 day. [1.2 mg PO x1, then 0.6 mg PO 1h later x1]. 3 tablet 0     No current facility-administered medications for this visit.       Review of patient's allergies indicates:    Allergen Reactions    Diltiazem Hives    Statins-hmg-coa reductase inhibitors      Other reaction(s): Myalgias (Muscle Pain)         Objective:      Physical Exam   Constitutional: She is oriented to person, place, and time. She appears well-developed. She is cooperative.  Non-toxic appearance. She does not appear ill. No distress.   HENT:   Head: Normocephalic and atraumatic.   Ears:   Right Ear: Hearing, tympanic membrane, external ear and ear canal normal. impacted cerumen  Left Ear: Hearing, tympanic membrane, external ear and ear canal normal. impacted cerumen  Nose: Nose normal. No mucosal edema, rhinorrhea, nasal deformity or congestion. No epistaxis. Right sinus exhibits no maxillary sinus tenderness and no frontal sinus tenderness. Left sinus exhibits no maxillary sinus tenderness and no frontal sinus tenderness.   Mouth/Throat: Uvula is midline, oropharynx is clear and moist and mucous membranes are normal. No trismus in the jaw. Normal dentition. No uvula swelling. No oropharyngeal exudate, posterior oropharyngeal edema or posterior oropharyngeal erythema.   Eyes: Conjunctivae and lids are normal. Right eye exhibits no discharge. Left eye exhibits no discharge. No scleral icterus.   Neck: Trachea normal and phonation normal. Neck supple. No edema present. No erythema present. No neck rigidity present.   Cardiovascular: Normal rate, normal heart sounds and normal pulses. An irregular rhythm present.   No murmur heard.Exam reveals no gallop.   Pulmonary/Chest: Effort normal and breath sounds normal. No stridor. No respiratory distress. She has no decreased breath sounds. She has no wheezes. She has no rhonchi. She has no rales.   Abdominal: Normal appearance.   Musculoskeletal:      Cervical back: She exhibits no tenderness.   Lymphadenopathy:     She has no cervical adenopathy.   Neurological: She is alert and oriented to person, place, and time. She displays no weakness. She exhibits normal muscle tone.  Coordination normal.   Skin: Skin is warm, dry, intact, not diaphoretic, not pale and no rash.   Psychiatric: Her speech is normal and behavior is normal. Mood, judgment and thought content normal.   Nursing note and vitals reviewed.      Results for orders placed or performed in visit on 01/19/23   SARS Coronavirus 2 Antigen, POCT Manual Read   Result Value Ref Range    SARS Coronavirus 2 Antigen Negative Negative     Acceptable Yes        Assessment:       1. Acute bronchitis, unspecified organism    2. Acute cough          Plan:     Results reviewed    Acute bronchitis, unspecified organism  -     benzonatate (TESSALON) 200 MG capsule; Take 1 capsule (200 mg total) by mouth 3 (three) times daily as needed for Cough.  Dispense: 30 capsule; Refill: 0  -     cetirizine (ZYRTEC) 10 MG tablet; Take 1 tablet (10 mg total) by mouth once daily.  Dispense: 30 tablet; Refill: 0    Acute cough  -     SARS Coronavirus 2 Antigen, POCT Manual Read  -     benzonatate (TESSALON) 200 MG capsule; Take 1 capsule (200 mg total) by mouth 3 (three) times daily as needed for Cough.  Dispense: 30 capsule; Refill: 0                 Patient Instructions   Stop taking mucinex. This is a viral infection antibiotics will not be helpful against it. Use zyrtec and cough suppressants as we discussed. Bronchitis can last for several weeks to several months. If your symptoms should worsen please return to the urgent care or go the emergency department for further evaluation. Use cough drops and warm salt gargles as needed for sore throat. Tylenol/ibuprofen as needed for pain/fevers. Drink plenty of fluids and get plenty of rest. F/u as needed

## 2023-01-19 NOTE — PATIENT INSTRUCTIONS
Stop taking mucinex. This is a viral infection antibiotics will not be helpful against it. Use zyrtec and cough suppressants as we discussed. Bronchitis can last for several weeks to several months. If your symptoms should worsen please return to the urgent care or go the emergency department for further evaluation. Use cough drops and warm salt gargles as needed for sore throat. Tylenol/ibuprofen as needed for pain/fevers. Drink plenty of fluids and get plenty of rest. F/u as needed

## 2024-04-19 DIAGNOSIS — I48.0 PAROXYSMAL ATRIAL FIBRILLATION: ICD-10-CM

## 2024-04-19 RX ORDER — SOTALOL HYDROCHLORIDE 80 MG/1
80 TABLET ORAL 2 TIMES DAILY
Qty: 180 TABLET | Refills: 3 | Status: SHIPPED | OUTPATIENT
Start: 2024-04-19